# Patient Record
Sex: MALE | Race: BLACK OR AFRICAN AMERICAN | NOT HISPANIC OR LATINO | Employment: FULL TIME | URBAN - METROPOLITAN AREA
[De-identification: names, ages, dates, MRNs, and addresses within clinical notes are randomized per-mention and may not be internally consistent; named-entity substitution may affect disease eponyms.]

---

## 2017-11-13 ENCOUNTER — OFFICE VISIT (OUTPATIENT)
Dept: FAMILY MEDICINE | Facility: CLINIC | Age: 52
End: 2017-11-13
Payer: COMMERCIAL

## 2017-11-13 VITALS
TEMPERATURE: 97 F | RESPIRATION RATE: 20 BRPM | SYSTOLIC BLOOD PRESSURE: 118 MMHG | BODY MASS INDEX: 25.62 KG/M2 | HEART RATE: 76 BPM | DIASTOLIC BLOOD PRESSURE: 80 MMHG | WEIGHT: 183 LBS | HEIGHT: 71 IN

## 2017-11-13 DIAGNOSIS — N52.9 VASCULOGENIC ERECTILE DYSFUNCTION, UNSPECIFIED VASCULOGENIC ERECTILE DYSFUNCTION TYPE: ICD-10-CM

## 2017-11-13 DIAGNOSIS — J20.8 ACUTE BRONCHITIS DUE TO OTHER SPECIFIED ORGANISMS: ICD-10-CM

## 2017-11-13 PROCEDURE — 99213 OFFICE O/P EST LOW 20 MIN: CPT | Mod: ,,, | Performed by: FAMILY MEDICINE

## 2017-11-13 RX ORDER — AZITHROMYCIN 250 MG/1
250 TABLET, FILM COATED ORAL DAILY
Qty: 6 TABLET | Refills: 0 | Status: SHIPPED | OUTPATIENT
Start: 2017-11-13 | End: 2018-03-13 | Stop reason: ALTCHOICE

## 2017-11-13 RX ORDER — PROMETHAZINE HYDROCHLORIDE AND DEXTROMETHORPHAN HYDROBROMIDE 6.25; 15 MG/5ML; MG/5ML
5 SYRUP ORAL 4 TIMES DAILY
Qty: 180 ML | Refills: 2 | Status: SHIPPED | OUTPATIENT
Start: 2017-11-13 | End: 2017-11-23

## 2017-11-13 RX ORDER — TADALAFIL 20 MG/1
20 TABLET ORAL DAILY
Qty: 12 TABLET | Refills: 11 | Status: SHIPPED | OUTPATIENT
Start: 2017-11-13 | End: 2018-06-29

## 2017-11-13 NOTE — PROGRESS NOTES
Subjective:       Patient ID: Abiodun Mora III is a 52 y.o. male.    Chief Complaint: Fever (101) and Chest Congestion    Fever    This is a new (5dd) problem. Episode onset: 5 dd. The problem occurs constantly. The problem has been unchanged. The maximum temperature noted was 101 to 101.9 F. The temperature was taken using an oral thermometer. Associated symptoms include chest pain (with cough), congestion and coughing. Pertinent negatives include no headaches, muscle aches or sore throat. Treatments tried: mucinex.   Risk factors: no contaminated food, no contaminated water, no occupational exposure, no recent sickness, no recent travel and no sick contacts    URI    This is a new problem. Episode onset: 5dd. The problem has been gradually improving. The maximum temperature recorded prior to his arrival was 101 - 101.9 F. The fever has been present for 1 to 2 days. Associated symptoms include chest pain (with cough), congestion and coughing. Pertinent negatives include no headaches or sore throat.     Review of Systems   Constitutional: Positive for fever.   HENT: Positive for congestion. Negative for sore throat.    Respiratory: Positive for cough.    Cardiovascular: Positive for chest pain (with cough).   Neurological: Negative for headaches.       Objective:      Physical Exam   Constitutional: He appears well-developed and well-nourished. No distress.   HENT:   Head: Normocephalic and atraumatic.   Right Ear: Hearing, tympanic membrane, external ear and ear canal normal. No drainage, swelling or tenderness. No foreign bodies. Tympanic membrane is not injected, not scarred, not perforated, not erythematous, not retracted and not bulging. Tympanic membrane mobility is normal. No middle ear effusion. No hemotympanum. No decreased hearing is noted.   Left Ear: Hearing, tympanic membrane, external ear and ear canal normal. No drainage, swelling or tenderness. No foreign bodies. Tympanic membrane is not  injected, not scarred, not perforated, not erythematous, not retracted and not bulging. Tympanic membrane mobility is normal.  No middle ear effusion. No hemotympanum. No decreased hearing is noted.   Nose: Nose normal. No mucosal edema, rhinorrhea, nose lacerations, sinus tenderness, nasal deformity or septal deviation. Right sinus exhibits no maxillary sinus tenderness and no frontal sinus tenderness. Left sinus exhibits no maxillary sinus tenderness and no frontal sinus tenderness.   Mouth/Throat: Uvula is midline and oropharynx is clear and moist. Normal dentition. No oropharyngeal exudate, posterior oropharyngeal edema, posterior oropharyngeal erythema or tonsillar abscesses.   Eyes: Conjunctivae and EOM are normal. Pupils are equal, round, and reactive to light. Right eye exhibits no discharge. Left eye exhibits no discharge. No scleral icterus.   Neck: Normal range of motion. Neck supple. No JVD present. No tracheal deviation present. No thyromegaly present.   Cardiovascular: Normal rate, regular rhythm, normal heart sounds and intact distal pulses.  Exam reveals no gallop and no friction rub.    No murmur heard.  Pulmonary/Chest: Effort normal and breath sounds normal. No stridor. No respiratory distress. He has no wheezes. He has no rales. He exhibits no tenderness.   Exp. Rhonchi/coarse congested cough   Abdominal: Soft. Bowel sounds are normal.   Lymphadenopathy:     He has no cervical adenopathy.   Skin: He is not diaphoretic.   Nursing note and vitals reviewed.      Assessment:       1. Acute bronchitis due to other specified organisms    2. Vasculogenic erectile dysfunction, unspecified vasculogenic erectile dysfunction type        Plan:       Abiodun was seen today for fever and chest congestion.    Diagnoses and all orders for this visit:    Acute bronchitis due to other specified organisms  -     azithromycin (Z-RANDA) 250 MG tablet; Take 1 tablet (250 mg total) by mouth once daily. po on day 1 then 1  tab po on days 2-5  -     promethazine-dextromethorphan (PROMETHAZINE-DM) 6.25-15 mg/5 mL Syrp; Take 5 mLs by mouth 4 (four) times daily.    Vasculogenic erectile dysfunction, unspecified vasculogenic erectile dysfunction type  -     tadalafil (CIALIS) 20 MG Tab; Take 1 tablet (20 mg total) by mouth once daily.         Return if symptoms worsen or fail to improve, for annual.

## 2017-11-16 DIAGNOSIS — J30.89 CHRONIC NON-SEASONAL ALLERGIC RHINITIS, UNSPECIFIED TRIGGER: Primary | ICD-10-CM

## 2017-11-16 RX ORDER — FLUTICASONE PROPIONATE 50 MCG
1 SPRAY, SUSPENSION (ML) NASAL DAILY
Qty: 1 BOTTLE | Refills: 11 | Status: SHIPPED | OUTPATIENT
Start: 2017-11-16 | End: 2018-09-25 | Stop reason: SDUPTHER

## 2017-11-16 RX ORDER — FLUTICASONE PROPIONATE 50 MCG
1 SPRAY, SUSPENSION (ML) NASAL DAILY
COMMUNITY
End: 2017-11-16 | Stop reason: SDUPTHER

## 2017-11-16 NOTE — TELEPHONE ENCOUNTER
----- Message from Glory Lincoln sent at 11/16/2017 10:18 AM CST -----  WANT TO KNOW CAN YOU REFILL HIS NASAL SPRAY [ FLUTICASONE/ PROPIONATE AND SEND [facility] TO [facility].

## 2018-01-29 ENCOUNTER — TELEPHONE (OUTPATIENT)
Dept: ORTHOPEDICS | Facility: CLINIC | Age: 53
End: 2018-01-29

## 2018-01-29 DIAGNOSIS — M25.511 RIGHT SHOULDER PAIN, UNSPECIFIED CHRONICITY: Primary | ICD-10-CM

## 2018-01-29 NOTE — TELEPHONE ENCOUNTER
Called and LVM  Asking pt to come in tomorrow morning due to MD having surgery in the afternoon.  Asked him to call back for appt time.

## 2018-01-31 DIAGNOSIS — M25.511 RIGHT SHOULDER PAIN, UNSPECIFIED CHRONICITY: Primary | ICD-10-CM

## 2018-02-02 ENCOUNTER — HOSPITAL ENCOUNTER (OUTPATIENT)
Dept: RADIOLOGY | Facility: HOSPITAL | Age: 53
Discharge: HOME | End: 2018-02-02
Attending: ORTHOPAEDIC SURGERY
Payer: OTHER MISCELLANEOUS

## 2018-02-02 ENCOUNTER — OFFICE VISIT (OUTPATIENT)
Dept: ORTHOPEDICS | Facility: CLINIC | Age: 53
End: 2018-02-02
Payer: OTHER MISCELLANEOUS

## 2018-02-02 VITALS
BODY MASS INDEX: 26.2 KG/M2 | WEIGHT: 183 LBS | SYSTOLIC BLOOD PRESSURE: 120 MMHG | HEIGHT: 70 IN | DIASTOLIC BLOOD PRESSURE: 76 MMHG | HEART RATE: 78 BPM

## 2018-02-02 DIAGNOSIS — M25.511 RIGHT SHOULDER PAIN, UNSPECIFIED CHRONICITY: ICD-10-CM

## 2018-02-02 DIAGNOSIS — M54.2 CERVICAL PAIN: Primary | ICD-10-CM

## 2018-02-02 DIAGNOSIS — M54.2 NECK PAIN ON LEFT SIDE: Primary | ICD-10-CM

## 2018-02-02 PROCEDURE — 73030 X-RAY EXAM OF SHOULDER: CPT | Mod: 26,LT,, | Performed by: RADIOLOGY

## 2018-02-02 PROCEDURE — 99203 OFFICE O/P NEW LOW 30 MIN: CPT | Mod: S$GLB,,, | Performed by: ORTHOPAEDIC SURGERY

## 2018-02-02 PROCEDURE — 99999 PR PBB SHADOW E&M-EST. PATIENT-LVL III: CPT | Mod: PBBFAC,,, | Performed by: ORTHOPAEDIC SURGERY

## 2018-02-02 PROCEDURE — 3008F BODY MASS INDEX DOCD: CPT | Mod: S$GLB,,, | Performed by: ORTHOPAEDIC SURGERY

## 2018-02-02 PROCEDURE — 73030 X-RAY EXAM OF SHOULDER: CPT | Mod: TC,PN,LT

## 2018-02-06 NOTE — PROGRESS NOTES
Past Medical History:   Diagnosis Date    Hyperlipidemia        Past Surgical History:   Procedure Laterality Date    COLONOSCOPY      2017    VASECTOMY  2000       Current Outpatient Prescriptions   Medication Sig    azithromycin (Z-RANDA) 250 MG tablet Take 1 tablet (250 mg total) by mouth once daily. po on day 1 then 1 tab po on days 2-5    fluticasone (FLONASE) 50 mcg/actuation nasal spray 1 spray by Each Nare route once daily.    tadalafil (CIALIS) 20 MG Tab Take 1 tablet (20 mg total) by mouth once daily.     No current facility-administered medications for this visit.        Review of patient's allergies indicates:  No Known Allergies    Family History   Problem Relation Age of Onset    Cancer Mother      colon    Cancer Father      prostate       Social History     Social History    Marital status:      Spouse name: N/A    Number of children: N/A    Years of education: N/A     Occupational History    Not on file.     Social History Main Topics    Smoking status: Never Smoker    Smokeless tobacco: Never Used    Alcohol use Yes    Drug use: No    Sexual activity: Not on file     Other Topics Concern    Not on file     Social History Narrative    No narrative on file       Chief Complaint:   Chief Complaint   Patient presents with    Left Shoulder - Injury, Pain       Consulting Physician: Self, Aaareferral    History of present illness:    This is a 52 y.o. 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 States he landed into wall. Pain 5/10 and worse with sleeping and neck motion.     Review of Systems:    Constitution: Denies chills, fever, and sweats.  HENT: Denies headaches or blurry vision.  Cardiovascular: Denies chest pain or irregular heart beat.  Respiratory: Denies cough or shortness of breath.  Gastrointestinal: Denies abdominal pain, nausea, or vomiting.  Musculoskeletal:  Denies muscle cramps.  Neurological: Denies dizziness or focal  "weakness.  Psychiatric/Behavioral: Normal mental status.  Hematologic/Lymphatic: Denies bleeding problem or easy bruising/bleeding.  Skin: Denies rash or suspicious lesions.    Examination:    Vital Signs:    Vitals:    02/02/18 1517   BP: 120/76   Pulse: 78   Weight: 83 kg (183 lb)   Height: 5' 10" (1.778 m)   PainSc:   5   PainLoc: Shoulder       Body mass index is 26.26 kg/m².    This a well-developed, well nourished patient in no acute distress.    Alert and oriented x 3 and cooperative to examination.       Physical Exam: Left Shoulder Exam     Skin  Rash:   None  Scars:   None    Inspection/Observation   Swelling:   None  Erythema:   None  Bruising:   None  Wounds:   None  Scapular Winging:  None  Scapular Dyskinesia:  None  Atrophy:   None  Masses:   None  Lymphadenopathy: None    Tenderness   AC Joint:   None    Range of Motion   Active Forward Flexion:  180   Active External Rotation:  >45  Active Internal Rotation:  Low Back    Passive Forward Flexion:  180  Passive External Rotation: >45  Passive Internal Rotation at 90 degrees: Normal    Muscle Strength   Forward Flexion:  5/5  External Rotation: 5/5  Internal Rotation:  5/5    Instability:  None    Tests & Signs   Cross Arm:   Negative  Hawkin's test:   Negative  Impingement:   Negative    Other   Sensation:  Normal  Pulse:    2+ radial      Neck Exam    Skin  Rash:   None  Scars:   None    Inspection  Erythema:  None  Ecchymosis:  None  Swelling:  None  Masses:  None  Lymphadenopathy: None    Range of Motion  Flexion:  Full  Extension:  Full  Right Rotation: Full  Left Rotation:  Full    Strength:  Normal  Instability:  None  Sensation:  Normal  Spurling's:  +  Tenderness:  Left trapezius/scalene          Imaging: X-rays ordered and reviewed today personally of the left shoulder appear normal        Assessment: Neck pain on left side        Plan:  His pain seems all neck based. Shoulder fine. Will send to PT. Already using flexeril and " ibuprofen.      DISCLAIMER: This note may have been dictated using voice recognition software and may contain grammatical errors.     NOTE: Consult report sent to referring provider via [employer] EMR.

## 2018-02-21 ENCOUNTER — CLINICAL SUPPORT (OUTPATIENT)
Dept: REHABILITATION | Facility: HOSPITAL | Age: 53
End: 2018-02-21
Attending: ORTHOPAEDIC SURGERY
Payer: OTHER MISCELLANEOUS

## 2018-02-21 DIAGNOSIS — M54.2 NECK PAIN: ICD-10-CM

## 2018-02-21 DIAGNOSIS — M54.2 NECK PAIN ON LEFT SIDE: Primary | ICD-10-CM

## 2018-02-21 DIAGNOSIS — R29.898 DECREASED RANGE OF MOTION OF NECK: ICD-10-CM

## 2018-02-21 PROCEDURE — 97010 HOT OR COLD PACKS THERAPY: CPT | Mod: PN

## 2018-02-21 PROCEDURE — 97161 PT EVAL LOW COMPLEX 20 MIN: CPT | Mod: PN

## 2018-02-21 PROCEDURE — 97110 THERAPEUTIC EXERCISES: CPT | Mod: PN

## 2018-02-21 NOTE — PLAN OF CARE
TIME RECORD    Date: 02/21/2018    Start Time:  1600  Stop Time:  1700    PROCEDURES:    TIMED  Procedure Time Min.   There ex Start:1645  Stop:1700    Heating pad Start:1630  Stop:1645     Start:  Stop:     Start:  Stop:          UNTIMED  Procedure Time Min.   eval Start:1600  Stop:1630     Start:  Stop:      Total Timed Minutes:  30  Total Timed Units:  2  Total Untimed Units:  1  Charges Billed/# of units:  3    OUTPATIENT PHYSICAL THERAPY   PATIENT EVALUATION  Onset Date: 01/23/18  Primary Diagnosis:   1. Neck pain on left side     2. Neck pain     3. Decreased range of motion of neck       Treatment Diagnosis: debility to left sided  Past Medical History:   Diagnosis Date    Hyperlipidemia      Precautions: none  Prior Therapy: none  Medications: Abiodun Mora III has a current medication list which includes the following prescription(s): azithromycin, fluticasone, and tadalafil.  Nutrition:  Normal  History of Present Illness: reports fell off a ladder at work about 3 wks ago; c/o lt sided neck and upper trap pain since then  Prior Level of Function: Independent  Social History: works as a   Place of Residence (Steps/Adaptations): lives w/ family in 1-story [facility] home  Functional Deficits Leading to Referral/Nature of Injury: difficulty performing everyday activities  Patient Therapy Goals: decrease c/o pain    Subjective     Abiodun Mora III states that his neck pain limits his ability to perform his everyday activities.    Pain:  Location: c/s and into lt upper trap  Description: Aching and Dull  Activities Which Increase Pain: AROM  Activities Which Decrease Pain: rest; muscle relaxors  Pain Scale: 3/10 at best 5/10 now  7/10 at worst    Objective     Posture: guarded due to pain  Palpation: pain w/ palpation to affected area  Sensation: intact  DTRs:  Range of Motion/Strength: MMT = 4/5 kailee. UE's throughout; AROM = limited lt rot and rt SB, WFL elsewhere    Flexibility: limited lt rot  and rt SB  Gait: Without AD  Analysis: WNL  Bed Mobility: NT  Transfers: Independent  Special Tests: NDI = 17/50  Other:   Treatment: x 10 c/s AROM = FB/BB, kailee. Rot., kailee. SB, chin tuck; x 15 min heating pad c/s; 5 x 10 sec lt upper trap stretch; provided pt. w/ HEP - instructed to perform qd    Assessment       Initial Assessment (Pertinent finding, problem list and factors affecting outcome): presents w/ mod. pain levels in c/s and lt upper trap; patient would benefit from PT to address this area and establish HEP  Rehab Potiential: good    Short Term Goals (2 Weeks):   1.  Rodriguez. tx session w/out increase in symptoms.  2.  Decrease c/o pain to 3/10    Long Term Goals (4 Weeks):   1.  Demo comp w/ HEP  2.  Improve NDI score to 13/50    Plan     Certification Period: 02/21/18 to 03/24/18  Recommended Treatment Plan: 2 times per week for 4 weeks (starting wk of 02/26/18): Group Therapy, Manual Therapy, Moist Heat/ Ice, Therapeutic Activites, Therapeutic Exercise and Other HEP  Other Recommendations: NA      Therapist: Anil Dominguez, PT    I CERTIFY THE NEED FOR THESE SERVICES FURNISHED UNDER THIS PLAN OF TREATMENT AND WHILE UNDER MY CARE    Physician's comments: ________________________________________________________________________________________________________________________________________________      Physician's Name: ___________________________________

## 2018-02-26 ENCOUNTER — TELEPHONE (OUTPATIENT)
Dept: REHABILITATION | Facility: HOSPITAL | Age: 53
End: 2018-02-26

## 2018-02-28 ENCOUNTER — CLINICAL SUPPORT (OUTPATIENT)
Dept: REHABILITATION | Facility: HOSPITAL | Age: 53
End: 2018-02-28
Attending: ORTHOPAEDIC SURGERY
Payer: OTHER MISCELLANEOUS

## 2018-02-28 DIAGNOSIS — M54.2 NECK PAIN: ICD-10-CM

## 2018-02-28 DIAGNOSIS — R29.898 DECREASED RANGE OF MOTION OF NECK: ICD-10-CM

## 2018-02-28 PROCEDURE — 97110 THERAPEUTIC EXERCISES: CPT | Mod: PN

## 2018-02-28 PROCEDURE — 97010 HOT OR COLD PACKS THERAPY: CPT | Mod: PN

## 2018-02-28 PROCEDURE — 97140 MANUAL THERAPY 1/> REGIONS: CPT | Mod: PN

## 2018-02-28 NOTE — PROGRESS NOTES
"Name: Abiodun JONES [facility] Number: [id number]  Date of Treatment: 02/28/2018   Diagnosis:   Encounter Diagnoses   Name Primary?    Neck pain     Decreased range of motion of neck        Time in: 0755  Time Out: 0843  Total Treatment Time: 48  Group Time: 0      Subjective:    Abiodun reports continued L upper trap soreness, reports doing HEP.  Patient reports their pain to be 6/10 on a 0-10 scale with 0 being no pain and 10 being the worst pain imaginable. Reports 2/10 pain after session.    Objective    Abiodun received therapeutic exercises to develop strength, endurance, flexibility and posture for 30 minutes including:     UBE 3'/3'  Cervical ROM 10 x5": chin tucks, flexion, extension, side bend, rotation R/L  L Upper trap stretch 5x10"  Pulleys x30: rows and ext 20#  Retro shoulder rolls x30  Shoulder shrugs x30    Abiodun received the following manual therapy techniques in sit: Myofacial release and Soft tissue Mobilization were applied to the: L upper trap and neck for 8 minutes.     Cold pack to neck 10' to decrease soreness    Pt demo good understanding of the education provided. Abiodun demonstrated good return demonstration of activities.     Assessment:     Pt will continue to benefit from skilled PT intervention. Medical Necessity is demonstrated by:  Pain limits function of effected part for some activities, Unable to participate fully in daily activities, Requires skilled supervision to complete and progress HEP and Weakness.    Patient is making good progress towards established goals.    Plan:    Continue with established Plan of Care towards PT goals.   "

## 2018-03-06 ENCOUNTER — CLINICAL SUPPORT (OUTPATIENT)
Dept: REHABILITATION | Facility: HOSPITAL | Age: 53
End: 2018-03-06
Payer: OTHER MISCELLANEOUS

## 2018-03-06 DIAGNOSIS — M54.2 NECK PAIN: ICD-10-CM

## 2018-03-06 DIAGNOSIS — R29.898 DECREASED RANGE OF MOTION OF NECK: ICD-10-CM

## 2018-03-06 PROCEDURE — 97110 THERAPEUTIC EXERCISES: CPT | Mod: PN

## 2018-03-06 PROCEDURE — 97010 HOT OR COLD PACKS THERAPY: CPT | Mod: PN

## 2018-03-06 PROCEDURE — 97140 MANUAL THERAPY 1/> REGIONS: CPT | Mod: PN

## 2018-03-06 NOTE — PROGRESS NOTES
"Name: Abiodun JONES [facility] Number: [id number]  Date of Treatment: 03/06/2018   Diagnosis:   Encounter Diagnoses   Name Primary?    Neck pain     Decreased range of motion of neck        Time in: 0800  Time Out: 0858  Total Treatment Time: 58  Group Time: 0      Subjective:    Abiodun reports continued neck soreness and doing HEP some.  Patient reports their pain to be 6-7/10 on a 0-10 scale with 0 being no pain and 10 being the worst pain imaginable.    Objective    Abiodun received therapeutic exercises to develop strength, endurance, flexibility and posture for 38 minutes including:     UBE 4'/4'  Cervical ROM 2x10 x5": chin tucks, flexion, extension, side bend, rotation R/L  L Upper trap stretch 5x10"  Pulleys 3x10: rows 30# and ext 20#  Retro shoulder rolls x30  Shoulder shrugs x30  Scapular squeezes x30    Abiodun received the following manual therapy techniques in supine: Myofacial release and Soft tissue Mobilization were applied to the: B upper trap and neck for 10 minutes.      Cold pack to neck 10' to decrease soreness    Pt demo good understanding of the education provided. Abiodun demonstrated good return demonstration of activities.     Assessment:     Pt will continue to benefit from skilled PT intervention. Medical Necessity is demonstrated by:  Pain limits function of effected part for some activities, Requires skilled supervision to complete and progress HEP and Weakness.    Patient is making fair progress towards established goals.    Plan:    Continue with established Plan of Care towards PT goals.   "

## 2018-03-09 ENCOUNTER — TELEPHONE (OUTPATIENT)
Dept: ORTHOPEDICS | Facility: CLINIC | Age: 53
End: 2018-03-09

## 2018-03-09 ENCOUNTER — CLINICAL SUPPORT (OUTPATIENT)
Dept: REHABILITATION | Facility: HOSPITAL | Age: 53
End: 2018-03-09
Attending: ORTHOPAEDIC SURGERY
Payer: OTHER MISCELLANEOUS

## 2018-03-09 DIAGNOSIS — M54.2 NECK PAIN: ICD-10-CM

## 2018-03-09 DIAGNOSIS — R29.898 DECREASED RANGE OF MOTION OF NECK: ICD-10-CM

## 2018-03-09 PROCEDURE — 97010 HOT OR COLD PACKS THERAPY: CPT | Mod: PN

## 2018-03-09 PROCEDURE — 97110 THERAPEUTIC EXERCISES: CPT | Mod: PN

## 2018-03-09 NOTE — PROGRESS NOTES
Name: Abiodun Mora III  Clinic Number: [id number]  Date of Treatment: 03/09/2018   Diagnosis:   Encounter Diagnoses   Name Primary?    Neck pain     Decreased range of motion of neck        Time in: 1510  Time Out: 1555  Total Treatment Time: 45  Group Time: NA      Subjective:    Abiodun reports worsening of symptoms.  Patient reports their pain to be 8/10 on a 0-10 scale with 0 being no pain and 10 being the worst pain imaginable.    Objective    Abiodun received therapeutic exercises to develop strength, ROM and flexibility for 45 minutes including:     X 15 min heating pad c/s and kailee. Upper traps  X 15 c/s AROM = FB/BB, kailee. Rot., kailee. SB, chin tucks  5 x 10 sec lt upper trap stretch  X 20 pulley there ex (30#) = rows, high rows, lat pull downs  X 5'/5' UBE (L2)    Assessment:     Mr. Mora was able to miya. tx session w/out increase in symptoms.    Pt will continue to benefit from skilled PT intervention. Medical Necessity is demonstrated by:  Pain limits function of effected part for some activities.    Patient is making fair progress towards established goals.    New/Revised goals: NA      Plan:  Continue with established Plan of Care towards PT goals.

## 2018-03-09 NOTE — TELEPHONE ENCOUNTER
Returned call to pt, LVM advising that since pain was from neck and not shoulder that MD gave one time Rx of medication.  If pain continues he would need to follow up w/ back/spine or PCP for further investigation and refills.

## 2018-03-09 NOTE — TELEPHONE ENCOUNTER
----- Message from Stacey M Lefort sent at 3/9/2018  4:04 PM CST -----  Patient came by and asked for a callback at [phone].  He needs to have refills called in for his Ibuprofen and Flexeril - [facility] [address] [facility]. Thank you.

## 2018-03-12 ENCOUNTER — CLINICAL SUPPORT (OUTPATIENT)
Dept: REHABILITATION | Facility: HOSPITAL | Age: 53
End: 2018-03-12
Payer: OTHER MISCELLANEOUS

## 2018-03-12 DIAGNOSIS — R29.898 DECREASED RANGE OF MOTION OF NECK: ICD-10-CM

## 2018-03-12 DIAGNOSIS — M54.2 NECK PAIN: ICD-10-CM

## 2018-03-12 PROCEDURE — 97010 HOT OR COLD PACKS THERAPY: CPT | Mod: PN

## 2018-03-12 PROCEDURE — 97110 THERAPEUTIC EXERCISES: CPT | Mod: PN

## 2018-03-12 NOTE — PROGRESS NOTES
"Name: Abiodun JONES [facility] Number: [id number]  Date of Treatment: 03/12/2018   Diagnosis:   Encounter Diagnoses   Name Primary?    Neck pain     Decreased range of motion of neck        Time in: 0855  Time Out: 0940  Total Treatment Time: 45  Group Time: 0      Subjective:    Abiodun reports continued neck soreness and doing HEP "some".  Patient reports their pain to be 5/10 on a 0-10 scale with 0 being no pain and 10 being the worst pain imaginable.    Objective    Abiodun received therapeutic exercises to develop strength, endurance, flexibility and posture for 30 minutes including:     UBE 5'/5'  Cervical ROM x15: Chin tucks, rotation, side bend, flexion, extension  Pulleys x30 30#: rows, high rows, pull downs  Scapular squeezes x30  Shoulder shrugs x30  Retro shoulder rolls x30    Hot pack to neck 15' to decrease soreness    Pt demo good understanding of the education provided. Abiodun demonstrated good return demonstration of activities.     Assessment:     Pt will continue to benefit from skilled PT intervention. Medical Necessity is demonstrated by:  Pain limits function of effected part for some activities, Requires skilled supervision to complete and progress HEP and Weakness.    Patient is making fair progress towards established goals.    Plan:    Continue with established Plan of Care towards PT goals.   "

## 2018-03-13 ENCOUNTER — OFFICE VISIT (OUTPATIENT)
Dept: FAMILY MEDICINE | Facility: CLINIC | Age: 53
End: 2018-03-13
Payer: COMMERCIAL

## 2018-03-13 VITALS
SYSTOLIC BLOOD PRESSURE: 110 MMHG | RESPIRATION RATE: 16 BRPM | TEMPERATURE: 98 F | WEIGHT: 190 LBS | BODY MASS INDEX: 27.2 KG/M2 | HEIGHT: 70 IN | HEART RATE: 76 BPM | DIASTOLIC BLOOD PRESSURE: 88 MMHG

## 2018-03-13 DIAGNOSIS — J02.9 PHARYNGITIS, UNSPECIFIED ETIOLOGY: Primary | ICD-10-CM

## 2018-03-13 DIAGNOSIS — J38.3 VOCAL CORD STRAIN: ICD-10-CM

## 2018-03-13 LAB
CTP QC/QA: YES
S PYO RRNA THROAT QL PROBE: NEGATIVE

## 2018-03-13 PROCEDURE — 87880 STREP A ASSAY W/OPTIC: CPT | Mod: QW,,, | Performed by: FAMILY MEDICINE

## 2018-03-13 PROCEDURE — 99213 OFFICE O/P EST LOW 20 MIN: CPT | Mod: ,,, | Performed by: FAMILY MEDICINE

## 2018-03-13 RX ORDER — CYCLOBENZAPRINE HCL 5 MG
5 TABLET ORAL 3 TIMES DAILY PRN
Refills: 0 | COMMUNITY
Start: 2018-01-14 | End: 2019-01-30

## 2018-03-13 RX ORDER — PROMETHAZINE HYDROCHLORIDE AND DEXTROMETHORPHAN HYDROBROMIDE 6.25; 15 MG/5ML; MG/5ML
SYRUP ORAL
COMMUNITY
Start: 2018-03-05 | End: 2018-06-29

## 2018-03-13 RX ORDER — METHYLPREDNISOLONE 4 MG/1
TABLET ORAL
Qty: 1 PACKAGE | Refills: 0 | Status: SHIPPED | OUTPATIENT
Start: 2018-03-13 | End: 2018-06-29

## 2018-03-13 RX ORDER — IBUPROFEN 800 MG/1
800 TABLET ORAL 3 TIMES DAILY
Refills: 0 | COMMUNITY
Start: 2018-01-14 | End: 2018-06-29

## 2018-03-13 NOTE — PROGRESS NOTES
Subjective:       Patient ID: Abiodun Mora III is a 52 y.o. male.    Chief Complaint: Sore Throat      Sore Throat    This is a new problem. Episode onset: 2 weeks. The problem has been waxing and waning. The pain is worse on the right side. There has been no fever. The pain is at a severity of 8/10. The pain is moderate. Associated symptoms include a hoarse voice. Pertinent negatives include no abdominal pain, congestion, coughing, diarrhea, drooling, ear discharge, ear pain, headaches, plugged ear sensation, neck pain, shortness of breath, stridor, swollen glands, trouble swallowing or vomiting.       Allergies and Medications:   Review of patient's allergies indicates:  No Known Allergies  Current Outpatient Prescriptions   Medication Sig Dispense Refill    cyclobenzaprine (FLEXERIL) 5 MG tablet Take 5 mg by mouth 3 (three) times daily as needed.  0    fluticasone (FLONASE) 50 mcg/actuation nasal spray 1 spray by Each Nare route once daily. 1 Bottle 11    ibuprofen (ADVIL,MOTRIN) 800 MG tablet Take 800 mg by mouth 3 (three) times daily.  0    promethazine-dextromethorphan (PROMETHAZINE-DM) 6.25-15 mg/5 mL Syrp       tadalafil (CIALIS) 20 MG Tab Take 1 tablet (20 mg total) by mouth once daily. 12 tablet 11     No current facility-administered medications for this visit.        Family History:   Family History   Problem Relation Age of Onset    Cancer Mother      colon    Cancer Father      prostate       Social History:   Social History     Social History    Marital status:      Spouse name: N/A    Number of children: N/A    Years of education: N/A     Occupational History    Not on file.     Social History Main Topics    Smoking status: Never Smoker    Smokeless tobacco: Never Used    Alcohol use Yes    Drug use: No    Sexual activity: Not on file     Other Topics Concern    Not on file     Social History Narrative    No narrative on file       Review of Systems   HENT: Positive for  hoarse voice and sore throat. Negative for congestion, drooling, ear discharge, ear pain and trouble swallowing.    Respiratory: Negative for cough, shortness of breath and stridor.    Gastrointestinal: Negative for abdominal pain, diarrhea and vomiting.   Musculoskeletal: Negative for neck pain.   Neurological: Negative for headaches.       Objective:     Vitals:    03/13/18 1032   BP: 110/88   Pulse: 76   Resp: 16   Temp: 97.5 °F (36.4 °C)        Physical Exam   Constitutional: He appears well-developed and well-nourished. No distress.   HENT:   Head: Normocephalic and atraumatic.   Right Ear: Hearing, tympanic membrane, external ear and ear canal normal. No drainage, swelling or tenderness. No foreign bodies. Tympanic membrane is not injected, not scarred, not perforated, not erythematous, not retracted and not bulging. Tympanic membrane mobility is normal. No middle ear effusion. No hemotympanum. No decreased hearing is noted.   Left Ear: Hearing, tympanic membrane, external ear and ear canal normal. No drainage, swelling or tenderness. No foreign bodies. Tympanic membrane is not injected, not scarred, not perforated, not erythematous, not retracted and not bulging. Tympanic membrane mobility is normal.  No middle ear effusion. No hemotympanum. No decreased hearing is noted.   Nose: Nose normal. No mucosal edema, rhinorrhea, nose lacerations, sinus tenderness, nasal deformity or septal deviation. Right sinus exhibits no maxillary sinus tenderness and no frontal sinus tenderness. Left sinus exhibits no maxillary sinus tenderness and no frontal sinus tenderness.   Mouth/Throat: Uvula is midline and oropharynx is clear and moist. Normal dentition. No oropharyngeal exudate, posterior oropharyngeal edema, posterior oropharyngeal erythema or tonsillar abscesses.       Eyes: Conjunctivae and EOM are normal. Pupils are equal, round, and reactive to light. Right eye exhibits no discharge. Left eye exhibits no discharge.  No scleral icterus.   Neck: Normal range of motion. Neck supple. No thyromegaly present.   Cardiovascular: Normal rate, regular rhythm, normal heart sounds and intact distal pulses.  Exam reveals no gallop and no friction rub.    No murmur heard.  Pulmonary/Chest: Effort normal and breath sounds normal. No stridor. No respiratory distress. He has no wheezes. He has no rales. He exhibits no tenderness.   Lymphadenopathy:     He has no cervical adenopathy.   Skin: He is not diaphoretic.   Nursing note and vitals reviewed.      Assessment:       No diagnosis found.    Plan:       There are no diagnoses linked to this encounter.     No Follow-up on file.

## 2018-03-13 NOTE — PATIENT INSTRUCTIONS
Benadryl and Maalox mouth rinse- for pharyngitis/laryngitis    Mix 4 oz. Of liquid Benadryl (diphenhydramine) with 4 oz of Maalox. Keep refrigerated. Mix and pour 1/2 oz into a medicine cup, gargle and spit, repeat three times a day.

## 2018-03-13 NOTE — LETTER
March 13, 2018      [facility]  [address] [facility] [address]  Phone: [phone]  Fax: [phone]       Patient: Abiodun Mora   YOB: 1965  Date of Visit: 03/13/2018    To Whom It May Concern:    JEREMIAS Mora  was at [facility] on 03/13/2018. He may return to work/school on 03/14/18 with no restrictions. If you have any questions or concerns, or if I can be of further assistance, please do not hesitate to contact me.    Sincerely,    Saul Mendez MD

## 2018-03-14 ENCOUNTER — CLINICAL SUPPORT (OUTPATIENT)
Dept: REHABILITATION | Facility: HOSPITAL | Age: 53
End: 2018-03-14
Payer: OTHER MISCELLANEOUS

## 2018-03-14 DIAGNOSIS — M54.2 NECK PAIN: ICD-10-CM

## 2018-03-14 DIAGNOSIS — R29.898 DECREASED RANGE OF MOTION OF NECK: ICD-10-CM

## 2018-03-14 PROCEDURE — 97010 HOT OR COLD PACKS THERAPY: CPT | Mod: PN

## 2018-03-14 PROCEDURE — 97110 THERAPEUTIC EXERCISES: CPT | Mod: PN

## 2018-03-14 PROCEDURE — 97140 MANUAL THERAPY 1/> REGIONS: CPT | Mod: PN

## 2018-03-14 NOTE — PROGRESS NOTES
"Name: Abiodun JONES [facility] Number: [id number]  Date of Treatment: 03/14/2018   Diagnosis:   Encounter Diagnoses   Name Primary?    Neck pain     Decreased range of motion of neck        Time in: 0805  Time Out: 0900  Total Treatment Time: 55  Group Time: 0      Subjective:    Abiodun reports continued difficulty with sleep positions and tends to be lesser during the day "when I'm busy", also reports soreness when sitting to watch TV.  Patient reports their pain to be 5/10 on a 0-10 scale with 0 being no pain and 10 being the worst pain imaginable. Patient encouraged to use a soft neck roll and explore options of supporting neck during these times to encourage relaxation of neck muscles.    Objective    Patient participated in dynamic functional therapeutic activities to improve functional performance for 30 minutes. Including:     OHP 5'  UBE Lv2 5'/5'  Cervical ROM 10x10":  R sidebend, R rotation  L UT stretch 3x30" while holding chair  L LS stretch 3x30" while holding chair  Pulleys x30 35#: rows, high rows, pull downs    Abiodun received the following manual therapy techniques: Myofacial release and Soft tissue Mobilization were applied to the: L upper trap for 10 minutes.     Hot pack to neck 15' to decrease soreness    Pt demo good understanding of the education provided. Abiodun demonstrated good return demonstration of activities.     Assessment:     Pt will continue to benefit from skilled PT intervention. Medical Necessity is demonstrated by:  Pain limits function of effected part for some activities, Requires skilled supervision to complete and progress HEP and Weakness.    Patient is making fair progress towards established goals.    Plan:    Continue with established Plan of Care towards PT goals.   "

## 2018-03-16 ENCOUNTER — DOCUMENTATION ONLY (OUTPATIENT)
Dept: REHABILITATION | Facility: HOSPITAL | Age: 53
End: 2018-03-16

## 2018-03-16 NOTE — PROGRESS NOTES
Mr. Mora would benefit from further PT visits to cont. addressing pain in left side of c/s and upper trap.  The pain is persistent but he reports paliative relief after treatment session.  Patient is receiving modalities and instruction in there ex/stretches - please cont. towards tx plan established on initial assessment.

## 2018-03-19 ENCOUNTER — CLINICAL SUPPORT (OUTPATIENT)
Dept: REHABILITATION | Facility: HOSPITAL | Age: 53
End: 2018-03-19
Payer: OTHER MISCELLANEOUS

## 2018-03-19 DIAGNOSIS — M54.2 NECK PAIN: ICD-10-CM

## 2018-03-19 DIAGNOSIS — R29.898 DECREASED RANGE OF MOTION OF NECK: ICD-10-CM

## 2018-03-19 PROCEDURE — 97140 MANUAL THERAPY 1/> REGIONS: CPT | Mod: PN

## 2018-03-19 PROCEDURE — 97110 THERAPEUTIC EXERCISES: CPT | Mod: PN

## 2018-03-19 PROCEDURE — 97150 GROUP THERAPEUTIC PROCEDURES: CPT | Mod: PN

## 2018-03-19 PROCEDURE — 97010 HOT OR COLD PACKS THERAPY: CPT | Mod: PN

## 2018-03-19 NOTE — PROGRESS NOTES
"Name: Abiodun JONES [facility] Number: [id number]  Date of Treatment: 03/19/2018   Diagnosis:   Encounter Diagnoses   Name Primary?    Neck pain     Decreased range of motion of neck        Time in: 0804  Time Out: 0900  Total Treatment Time: 56  Grouped time: 3214-3515      Subjective:    Abiodun reports I was really in more pain when I got here this am, but as I have started moving around a bit it's loosening up and feeling better.  Patient reports their pain to be 8/10 on a 0-10 scale with 0 being no pain and 10 being the worst pain imaginable.    Objective    Patient received individual and group therapy to increase strength, endurance, ROM, flexibility and posture with 1 patients with activities as follows:     Abiodun received therapeutic exercises to develop strength, endurance, ROM, flexibility and posture for 45 minutes including:    OHP 5'    UBE Lv2 5'/5' (1-1)   Cervical ROM 10x10":  R sidebend, R rotation   L UT stretch 3x30" while holding chair   L LS stretch 3x30" while holding chair   Pulleys x30 35#: rows, high rows, pull downs      MHP x 15' to cspine prior to MT.   Abiodun received the following manual therapy techniques: Myofacial release and Soft tissue Mobilization were applied to the: L upper trap for 10 minutes.     Written Home Exercises Provided: Cont with HEP  Pt demo good understanding of the education provided. Abiodun demonstrated good return demonstration of activities.     Assessment:   Patient with increasing ROM with decreasing discomfort, decreased tenderness with MT    Pt will continue to benefit from skilled PT intervention. Medical Necessity is demonstrated by:  Continued inability to participate in vocational pursuits, Pain limits function of effected part for some activities, Unable to participate fully in daily activities, Requires skilled supervision to complete and progress HEP and Weakness.    Patient is making good progress towards established goals.    Plan:  Continue with " established Plan of Care towards PT goals.

## 2018-03-21 ENCOUNTER — CLINICAL SUPPORT (OUTPATIENT)
Dept: REHABILITATION | Facility: HOSPITAL | Age: 53
End: 2018-03-21
Attending: ORTHOPAEDIC SURGERY
Payer: OTHER MISCELLANEOUS

## 2018-03-21 DIAGNOSIS — R29.898 DECREASED RANGE OF MOTION OF NECK: ICD-10-CM

## 2018-03-21 DIAGNOSIS — M54.2 NECK PAIN: ICD-10-CM

## 2018-03-21 PROCEDURE — 97110 THERAPEUTIC EXERCISES: CPT | Mod: PN

## 2018-03-21 PROCEDURE — 97010 HOT OR COLD PACKS THERAPY: CPT | Mod: PN

## 2018-03-21 NOTE — PROGRESS NOTES
[facility]   [facility] Number: [id number]   Age: 52 y.o.   Diagnosis:   Encounter Diagnoses   Name Primary?    Neck pain     Decreased range of motion of neck       Physician: Eugene Ortez MD   Original Orders : PT eval and treat  Initial visit: 02/21/18  Date of Last visit: 03/21/18  Date of Discharge Note:  03/21/18  Total Visits Received: 8  Missed Visits: 1    Subjective: reports 7/10 pain    Objective:  Treatment :    Included:Therapeutic exercise, AROM, Moist heat, Stretching and HEP        Treatment today:      X 15 min heating pad c/s and kailee. Upper traps  X 15 c/s AROM = FB/BB, kailee. Rot., kailee. SB, chin tucks  5 x 10 sec lt upper trap stretch  Reviewed HEP - understood importance of cont. to perform on his own to maintain therapeutic gains    Time In: 1500  Time Out: 1545     Assessment: NDI score = 15/50; independent w/ HEP    Goals Achieved: 1 of 2 stg's met; 1 of 2 ltg's met    Discharge reason : Patient has maximized benefit from PT at this time    Discharge plan :Continue HEP and follow up w/ MD    Plan:  This patient is discharged from [facility] Services.

## 2018-04-13 ENCOUNTER — TELEPHONE (OUTPATIENT)
Dept: ORTHOPEDICS | Facility: CLINIC | Age: 53
End: 2018-04-13

## 2018-04-13 NOTE — TELEPHONE ENCOUNTER
Pt returned my call, advised that he was released from our care at last appt.  He will need to contact his worker's comp carrier to be referred to appropriate neck/back/spine provider.  appt for 4/18 has been cancelled.

## 2018-04-13 NOTE — TELEPHONE ENCOUNTER
Called and LVM askng pt to call me back.  Was last seen for shoulder pain that turned out to be neck related.  Pt was released back to work w/ no restriction.  Need to know if f/u is for neck, if so appt will need to be cancelled as Dr. Ortez does not treat neck/back issues.

## 2018-06-29 ENCOUNTER — OFFICE VISIT (OUTPATIENT)
Dept: FAMILY MEDICINE | Facility: CLINIC | Age: 53
End: 2018-06-29

## 2018-06-29 VITALS
DIASTOLIC BLOOD PRESSURE: 80 MMHG | SYSTOLIC BLOOD PRESSURE: 110 MMHG | HEART RATE: 60 BPM | BODY MASS INDEX: 26.74 KG/M2 | WEIGHT: 191 LBS | HEIGHT: 71 IN | OXYGEN SATURATION: 98 % | RESPIRATION RATE: 16 BRPM | TEMPERATURE: 97 F

## 2018-06-29 DIAGNOSIS — Z23 IMMUNIZATION DUE: ICD-10-CM

## 2018-06-29 DIAGNOSIS — N52.8 OTHER MALE ERECTILE DYSFUNCTION: ICD-10-CM

## 2018-06-29 DIAGNOSIS — Z00.00 WELL ADULT EXAM: Primary | ICD-10-CM

## 2018-06-29 DIAGNOSIS — E78.2 MIXED HYPERLIPIDEMIA: ICD-10-CM

## 2018-06-29 PROBLEM — E78.5 HYPERLIPIDEMIA: Status: ACTIVE | Noted: 2018-06-29

## 2018-06-29 PROCEDURE — 90715 TDAP VACCINE 7 YRS/> IM: CPT | Mod: ,,, | Performed by: FAMILY MEDICINE

## 2018-06-29 PROCEDURE — 90471 IMMUNIZATION ADMIN: CPT | Mod: ,,, | Performed by: FAMILY MEDICINE

## 2018-06-29 PROCEDURE — 99396 PREV VISIT EST AGE 40-64: CPT | Mod: ,,, | Performed by: FAMILY MEDICINE

## 2018-06-29 RX ORDER — TADALAFIL 20 MG/1
20 TABLET ORAL DAILY
Qty: 30 TABLET | Refills: 11 | Status: SHIPPED | OUTPATIENT
Start: 2018-06-29 | End: 2019-01-30

## 2018-06-29 RX ORDER — ROSUVASTATIN CALCIUM 10 MG/1
10 TABLET, COATED ORAL DAILY
Qty: 30 TABLET | Refills: 5 | Status: SHIPPED | OUTPATIENT
Start: 2018-06-29 | End: 2018-07-11 | Stop reason: ALTCHOICE

## 2018-06-29 RX ORDER — TADALAFIL 20 MG/1
20 TABLET ORAL DAILY
Qty: 30 TABLET | Refills: 11 | Status: SHIPPED | OUTPATIENT
Start: 2018-06-29 | End: 2018-06-29 | Stop reason: CLARIF

## 2018-06-29 NOTE — PROGRESS NOTES
Subjective:       Patient ID: Abiodun Mora III is a 52 y.o. male.    Chief Complaint: Annual Exam      HPI    Allergies and Medications:   Review of patient's allergies indicates:  No Known Allergies  Current Outpatient Prescriptions   Medication Sig Dispense Refill    cyclobenzaprine (FLEXERIL) 5 MG tablet Take 5 mg by mouth 3 (three) times daily as needed.  0    fluticasone (FLONASE) 50 mcg/actuation nasal spray 1 spray by Each Nare route once daily. 1 Bottle 11    rosuvastatin (CRESTOR) 10 MG tablet Take 1 tablet (10 mg total) by mouth once daily. 30 tablet 5    tadalafil (CIALIS) 20 MG Tab Take 1 tablet (20 mg total) by mouth once daily. 30 tablet 11     No current facility-administered medications for this visit.        Family History:   Family History   Problem Relation Age of Onset    Cancer Mother         colon    Cancer Father         prostate       Social History:   Social History     Social History    Marital status:      Spouse name: N/A    Number of children: N/A    Years of education: N/A     Occupational History    Not on file.     Social History Main Topics    Smoking status: Never Smoker    Smokeless tobacco: Never Used    Alcohol use Yes    Drug use: No    Sexual activity: Not on file     Other Topics Concern    Not on file     Social History Narrative    No narrative on file       Review of Systems   Constitutional: Negative for activity change, appetite change, chills, diaphoresis, fatigue, fever and unexpected weight change.   HENT: Negative for congestion, dental problem, drooling, ear discharge, ear pain, facial swelling, hearing loss, mouth sores, nosebleeds, postnasal drip, rhinorrhea, sinus pain, sinus pressure, sneezing, sore throat, tinnitus, trouble swallowing and voice change.    Eyes: Negative for photophobia, pain, discharge, redness, itching and visual disturbance.   Respiratory: Negative for apnea, cough, choking, chest tightness, shortness of breath,  wheezing and stridor.    Cardiovascular: Negative for chest pain, palpitations and leg swelling.   Gastrointestinal: Negative for abdominal distention, abdominal pain, anal bleeding, blood in stool, constipation, diarrhea, nausea, rectal pain and vomiting.   Endocrine: Negative for cold intolerance, heat intolerance, polydipsia, polyphagia and polyuria.   Genitourinary: Negative for decreased urine volume, difficulty urinating, discharge, dysuria, enuresis, flank pain, frequency, genital sores, hematuria, penile pain, penile swelling, scrotal swelling, testicular pain and urgency.   Musculoskeletal: Positive for neck pain (cervical strain). Negative for arthralgias, back pain, gait problem, joint swelling, myalgias and neck stiffness.   Skin: Negative for color change, pallor, rash and wound.   Allergic/Immunologic: Negative for environmental allergies, food allergies and immunocompromised state.   Neurological: Negative for dizziness, tremors, seizures, syncope, facial asymmetry, speech difficulty, weakness, light-headedness, numbness and headaches.   Hematological: Negative for adenopathy. Does not bruise/bleed easily.   Psychiatric/Behavioral: Negative for agitation, behavioral problems, confusion, decreased concentration, dysphoric mood, hallucinations, self-injury, sleep disturbance and suicidal ideas. The patient is not nervous/anxious and is not hyperactive.        Objective:     Vitals:    06/29/18 0838   BP: 110/80   Pulse: 60   Resp: 16   Temp: 97.4 °F (36.3 °C)        Physical Exam   Constitutional: He is oriented to person, place, and time. He appears well-developed and well-nourished.  Non-toxic appearance. He does not have a sickly appearance. He does not appear ill. No distress.   HENT:   Head: Normocephalic and atraumatic.   Right Ear: External ear normal.   Left Ear: External ear normal.   Nose: Nose normal.   Mouth/Throat: Oropharynx is clear and moist. No oropharyngeal exudate.   Eyes:  Conjunctivae and EOM are normal. Pupils are equal, round, and reactive to light. Right eye exhibits no discharge. Left eye exhibits no discharge. No scleral icterus.   Neck: Normal range of motion. Neck supple. No JVD present. No tracheal deviation present. No thyromegaly present.   Cardiovascular: Normal rate, normal heart sounds and intact distal pulses.  Exam reveals no gallop and no friction rub.    No murmur heard.  Pulmonary/Chest: Effort normal and breath sounds normal. No stridor. No respiratory distress. He has no wheezes. He has no rales. He exhibits no tenderness.   Abdominal: Soft. Bowel sounds are normal. He exhibits no distension and no mass. There is no tenderness. There is no rebound and no guarding. No hernia.   Small soft rubbery lateral inguinal node on the right side no other adenopathy is noted otherwise normal external  exam. No hernia palpable.   Genitourinary: Testes normal. Cremasteric reflex is present. Right testis shows no mass, no swelling and no tenderness. Right testis is descended. Cremasteric reflex is not absent on the right side. Left testis shows no mass, no swelling and no tenderness. Left testis is descended. Cremasteric reflex is not absent on the left side. Circumcised. No phimosis, paraphimosis, hypospadias or penile erythema. No discharge found.   Musculoskeletal: He exhibits no edema, tenderness or deformity.   Palpable tightness in the trapezius both sides range of motion seems to be intact with some tenderness to to contralateral flexion.   Lymphadenopathy:     He has no cervical adenopathy.   Neurological: He is alert and oriented to person, place, and time. He has normal reflexes. He displays normal reflexes. No cranial nerve deficit or sensory deficit. He exhibits normal muscle tone. Coordination normal.   Skin: Skin is warm and dry. No rash noted. He is not diaphoretic. No erythema. No pallor.   Skin survey negative.   Psychiatric: He has a normal mood and affect.  His behavior is normal. Judgment and thought content normal.   Nursing note and vitals reviewed.      Assessment:       1. Well adult exam    2. Mixed hyperlipidemia    3. Other male erectile dysfunction    4. Immunization due        Plan:       Abiodun was seen today for annual exam.    Diagnoses and all orders for this visit:    Well adult exam  -     Hemoglobin A1c; Future  -     Lipid panel; Future  -     Comprehensive metabolic panel; Future  -     CBC auto differential; Future  -     PSA, Screening; Future  -     Hepatitis C antibody; Future  -     Hemoglobin A1c  -     Lipid panel  -     Comprehensive metabolic panel  -     CBC auto differential  -     PSA, Screening  -     Hepatitis C antibody    Mixed hyperlipidemia  -     Lipid panel; Future  -     rosuvastatin (CRESTOR) 10 MG tablet; Take 1 tablet (10 mg total) by mouth once daily.  -     Lipid panel    Other male erectile dysfunction  -     tadalafil (CIALIS) 20 MG Tab; Take 1 tablet (20 mg total) by mouth once daily.    Immunization due  -     (In Office Administered) Tdap Vaccine    Other orders  -     Discontinue: tadalafil (CIALIS) 20 MG Tab; Take 1 tablet (20 mg total) by mouth once daily.         Follow-up in about 6 months (around 12/29/2018).

## 2018-07-03 LAB
ALBUMIN SERPL-MCNC: 4.1 G/DL (ref 3.1–4.7)
ALP SERPL-CCNC: 49 IU/L (ref 40–104)
ALT (SGPT): 31 IU/L (ref 3–33)
AST SERPL-CCNC: 26 IU/L (ref 10–40)
BASOPHILS NFR BLD: 0 K/UL (ref 0–0.2)
BASOPHILS NFR BLD: 0.6 %
BILIRUB SERPL-MCNC: 0.8 MG/DL (ref 0.3–1)
BUN SERPL-MCNC: 14 MG/DL (ref 8–20)
CALCIUM SERPL-MCNC: 9.3 MG/DL (ref 7.7–10.4)
CHLORIDE: 103 MMOL/L (ref 98–110)
CO2 SERPL-SCNC: 28.1 MMOL/L (ref 22.8–31.6)
COMPLEXED PSA SERPL-MCNC: 0.6 NG/ML (ref 0–3)
CREATININE: 1.21 MG/DL (ref 0.6–1.4)
EOSINOPHIL NFR BLD: 0.1 K/UL (ref 0–0.7)
EOSINOPHIL NFR BLD: 3.9 %
ERYTHROCYTE [DISTWIDTH] IN BLOOD BY AUTOMATED COUNT: 13.5 % (ref 11.7–14.9)
GLUCOSE: 97 MG/DL (ref 70–99)
GRAN #: 1.4 K/UL (ref 1.4–6.5)
GRAN%: 42.5 %
HBA1C MFR BLD: 5.7 % (ref 3.1–6.5)
HCT VFR BLD AUTO: 41.8 % (ref 39–55)
HGB BLD-MCNC: 13.6 G/DL (ref 14–16)
IMMATURE GRANS (ABS): 0 K/UL (ref 0–1)
IMMATURE GRANULOCYTES: 0.3 %
LYMPH #: 1.2 K/UL (ref 1.2–3.4)
LYMPH%: 35.3 %
MCH RBC QN AUTO: 27 PG (ref 25–35)
MCHC RBC AUTO-ENTMCNC: 32.5 G/DL (ref 31–36)
MCV RBC AUTO: 82.9 FL (ref 80–100)
MONO #: 0.6 K/UL (ref 0.1–0.6)
MONO%: 17.4 %
NUCLEATED RBCS: 0 %
PLATELET # BLD AUTO: 196 K/UL (ref 140–440)
PMV BLD AUTO: 9.4 FL (ref 8.8–12.7)
POTASSIUM SERPL-SCNC: 4.3 MMOL/L (ref 3.5–5)
PROT SERPL-MCNC: 7.8 G/DL (ref 6–8.2)
RBC # BLD AUTO: 5.04 M/UL (ref 4.3–5.9)
SODIUM: 139 MMOL/L (ref 134–144)
WBC # BLD AUTO: 3.3 K/UL (ref 5–10)

## 2018-07-04 LAB — HCV AB SERPL QL IA: <0.1 S/CO RATIO (ref 0–0.9)

## 2018-07-11 ENCOUNTER — TELEPHONE (OUTPATIENT)
Dept: FAMILY MEDICINE | Facility: CLINIC | Age: 53
End: 2018-07-11

## 2018-07-11 DIAGNOSIS — E78.2 MIXED HYPERLIPIDEMIA: Primary | ICD-10-CM

## 2018-07-11 RX ORDER — ATORVASTATIN CALCIUM 20 MG/1
20 TABLET, FILM COATED ORAL DAILY
Qty: 90 TABLET | Refills: 3 | Status: SHIPPED | OUTPATIENT
Start: 2018-07-11 | End: 2018-09-19 | Stop reason: SDUPTHER

## 2018-07-11 NOTE — TELEPHONE ENCOUNTER
Insurance preferred cholesterol medication is Atorvastatin Please change Crestor rx to Atorvastatin

## 2018-07-11 NOTE — TELEPHONE ENCOUNTER
Patient reports that atorvastatin is better covered on his prescription formulary change to 20 mg of this one which is comparable to 10 mg of Crestor.

## 2018-09-19 DIAGNOSIS — E78.2 MIXED HYPERLIPIDEMIA: ICD-10-CM

## 2018-09-19 RX ORDER — ATORVASTATIN CALCIUM 20 MG/1
20 TABLET, FILM COATED ORAL DAILY
Qty: 90 TABLET | Refills: 3 | Status: SHIPPED | OUTPATIENT
Start: 2018-09-19 | End: 2019-12-11 | Stop reason: SDUPTHER

## 2018-09-25 DIAGNOSIS — Z88.9 H/O SEASONAL ALLERGIES: ICD-10-CM

## 2018-09-25 RX ORDER — ROSUVASTATIN CALCIUM 10 MG/1
10 TABLET, COATED ORAL DAILY
Refills: 5 | COMMUNITY
Start: 2018-08-09 | End: 2019-01-30

## 2018-09-25 RX ORDER — FLUTICASONE PROPIONATE 50 MCG
1 SPRAY, SUSPENSION (ML) NASAL DAILY
Qty: 1 BOTTLE | Refills: 11 | Status: SHIPPED | OUTPATIENT
Start: 2018-09-25 | End: 2019-10-17 | Stop reason: SDUPTHER

## 2018-10-01 PROBLEM — Z00.00 WELL ADULT EXAM: Status: RESOLVED | Noted: 2018-06-29 | Resolved: 2018-10-01

## 2019-01-30 ENCOUNTER — OFFICE VISIT (OUTPATIENT)
Dept: FAMILY MEDICINE | Facility: CLINIC | Age: 54
End: 2019-01-30
Payer: COMMERCIAL

## 2019-01-30 VITALS
DIASTOLIC BLOOD PRESSURE: 68 MMHG | TEMPERATURE: 100 F | BODY MASS INDEX: 27.37 KG/M2 | RESPIRATION RATE: 14 BRPM | HEART RATE: 118 BPM | HEIGHT: 70 IN | OXYGEN SATURATION: 97 % | SYSTOLIC BLOOD PRESSURE: 110 MMHG | WEIGHT: 191.19 LBS

## 2019-01-30 DIAGNOSIS — J10.1 INFLUENZA A: ICD-10-CM

## 2019-01-30 DIAGNOSIS — J06.9 URI WITH COUGH AND CONGESTION: Primary | ICD-10-CM

## 2019-01-30 PROCEDURE — 3008F BODY MASS INDEX DOCD: CPT | Mod: ,,, | Performed by: NURSE PRACTITIONER

## 2019-01-30 PROCEDURE — 99213 PR OFFICE/OUTPT VISIT, EST, LEVL III, 20-29 MIN: ICD-10-PCS | Mod: ,,, | Performed by: NURSE PRACTITIONER

## 2019-01-30 PROCEDURE — 99213 OFFICE O/P EST LOW 20 MIN: CPT | Mod: ,,, | Performed by: NURSE PRACTITIONER

## 2019-01-30 PROCEDURE — 3008F PR BODY MASS INDEX (BMI) DOCUMENTED: ICD-10-PCS | Mod: ,,, | Performed by: NURSE PRACTITIONER

## 2019-01-30 RX ORDER — OSELTAMIVIR PHOSPHATE 75 MG/1
75 CAPSULE ORAL 2 TIMES DAILY
Qty: 10 CAPSULE | Refills: 0 | Status: SHIPPED | OUTPATIENT
Start: 2019-01-30 | End: 2019-02-04

## 2019-01-30 RX ORDER — PROMETHAZINE HYDROCHLORIDE AND DEXTROMETHORPHAN HYDROBROMIDE 6.25; 15 MG/5ML; MG/5ML
5 SYRUP ORAL EVERY 6 HOURS PRN
Qty: 118 ML | Refills: 0 | Status: SHIPPED | OUTPATIENT
Start: 2019-01-30 | End: 2019-02-04

## 2019-01-30 NOTE — PATIENT INSTRUCTIONS
Influenza (Adult)    Influenza is also called the flu. It is a viral illness that affects the air passages of your lungs. It is different from the common cold. The flu can easily be passed from one to person to another. It may be spread through the air by coughing and sneezing. Or it can be spread by touching the sick person and then touching your own eyes, nose, or mouth.  The flu starts 1 to 3 days after you are exposed to the flu virus. It may last for 1 to 2 weeks but many people feel tired or fatigued for many weeks afterward. You usually dont need to take antibiotics unless you have a complication. This might be an ear or sinus infection or pneumonia.  Symptoms of the flu may be mild or severe. They can include extreme tiredness (wanting to stay in bed all day), chills, fevers, muscle aches, soreness with eye movement, headache, and a dry, hacking cough.  Home care  Follow these guidelines when caring for yourself at home:  · Avoid being around cigarette smoke, whether yours or other peoples.  · Acetaminophen or ibuprofen will help ease your fever, muscle aches, and headache. Dont give aspirin to anyone younger than 18 who has the flu. Aspirin can harm the liver.  · Nausea and loss of appetite are common with the flu. Eat light meals. Drink 6 to 8 glasses of liquids every day. Good choices are water, sport drinks, soft drinks without caffeine, juices, tea, and soup. Extra fluids will also help loosen secretions in your nose and lungs.  · Over-the-counter cold medicines will not make the flu go away faster. But the medicines may help with coughing, sore throat, and congestion in your nose and sinuses. Dont use a decongestant if you have high blood pressure.  · Stay home until your fever has been gone for at least 24 hours without using medicine to reduce fever.  Follow-up care  Follow up with your healthcare provider, or as advised, if you are not getting better over the next week.  If you are age 65 or  older, talk with your provider about getting a pneumococcal vaccine every 5 years. You should also get this vaccine if you have chronic asthma or COPD. All adults should get a flu vaccine every fall. Ask your provider about this.  When to seek medical advice  Call your healthcare provider right away if any of these occur:  · Cough with lots of colored mucus (sputum) or blood in your mucus  · Chest pain, shortness of breath, wheezing, or trouble breathing  · Severe headache, or face, neck, or ear pain  · New rash with fever  · Fever of 100.4°F (38°C) or higher, or as directed by your healthcare provider  · Confusion, behavior change, or seizure  · Severe weakness or dizziness  · You get a new fever or cough after getting better for a few days  Date Last Reviewed: 1/1/2017  © 1364-6459 [employer]. [address] [place] [address], [place] [address]. All rights reserved. This information is not intended as a substitute for professional medical care. Always follow your healthcare professional's instructions.        Adult Self-Care for Colds  Colds are caused by viruses. They can't be cured with antibiotics. However, you can ease symptoms and support your body's efforts to heal itself.  No matter which symptoms you have, be sure to:  · Drink plenty of fluids (water or clear soup)  · Stop smoking and drinking alcohol  · Get plenty of rest    Understand a fever  · Take your temperature several times a day. If your fever is 100.4°F (38.0°C) for more than a day, call your healthcare provider.  · Relax, lie down. Go to bed if you want. Just get off your feet and rest. Also, drink plenty of fluids to avoid dehydration.  · Take acetaminophen or a nonsteroidal anti-inflammatory agent (NSAID), such as ibuprofen.  Treat a troubled nose kindly  · Breathe steam or heated humidified air to open blocked nasal passages.  a hot shower or use a vaporizer. Be careful not to get burned by the steam.  · Saline nasal sprays and  decongestant tablets help open a stuffy nose. Antihistamines can also help, but they can cause side effects such as drowsiness and drying of the eyes, nose, and mouth.  Soothe a sore throat and cough  · Gargle every 2 hours with 1/4 teaspoon of salt dissolved in 1/2 cup of warm water. Suck on throat lozenges and cough drops to moisten your throat.  · Cough medicines are available but it is unclear how well they actually work.  · Take acetaminophen or an NSAID, such as ibuprofen, to ease throat pain  Ease digestive problems  · Put fluids back into your body. Take frequent sips of clear liquids such as water or broth. Avoid drinks that have a lot of sugar in them, such as juices and sodas. These can make diarrhea worse. Older children and adults can drink sports drinks.  · As your appetite returns, you can resume your normal diet. Ask your healthcare provider if there are any foods you should avoid.  When to seek medical care  When you first notice symptoms, ask your healthcare provider if antiviral medicines are appropriate. Antibiotics should not be taken for colds or flu. Also, call your healthcare provider if you have any of the following symptoms or if you aren't feeling better after 7 days:  · Shortness of breath  · Pain or pressure in the chest or belly (abdomen)  · Worsening symptoms, especially after a period of improvement  · Fever of 100.4°F  (38.0°C) or higher, or fever that doesn't go down with medicine  · Sudden dizziness or confusion  · Severe or continued vomiting  · Signs of dehydration, including extreme thirst, dark urine, infrequent urination, dry mouth  · Spotted, red, or very sore throat   Date Last Reviewed: 12/1/2016  © 5006-5022 [employer]. [address] [place] [address], [place] [address]. All rights reserved. This information is not intended as a substitute for professional medical care. Always follow your healthcare professional's instructions.

## 2019-01-30 NOTE — PROGRESS NOTES
SUBJECTIVE:      Patient ID: Abiodun Mora III is a 53 y.o. male.    Chief Complaint: Cough; Generalized Body Aches; and Nasal Congestion    Sick visit - pt of Dr. Andrea CARMONA    This is a new problem. The current episode started in the past 7 days. Maximum temperature: low grade. The fever has been present for less than 1 day. Associated symptoms include congestion, coughing and sinus pain. Pertinent negatives include no abdominal pain, chest pain, diarrhea, dysuria, ear pain, headaches, nausea, neck pain, rash, sneezing, sore throat, vomiting or wheezing. He has tried acetaminophen (home nebs, flonase) for the symptoms. The treatment provided mild relief.       Past Surgical History:   Procedure Laterality Date    COLONOSCOPY      2017    VASECTOMY  2000     Family History   Problem Relation Age of Onset    Cancer Mother         colon    Cancer Father         prostate      Social History     Socioeconomic History    Marital status:      Spouse name: None    Number of children: None    Years of education: None    Highest education level: None   Social Needs    Financial resource strain: None    Food insecurity - worry: None    Food insecurity - inability: None    Transportation needs - medical: None    Transportation needs - non-medical: None   Occupational History    None   Tobacco Use    Smoking status: Never Smoker    Smokeless tobacco: Never Used   Substance and Sexual Activity    Alcohol use: Yes    Drug use: No    Sexual activity: None   Other Topics Concern    None   Social History Narrative    None     Current Outpatient Medications   Medication Sig Dispense Refill    atorvastatin (LIPITOR) 20 MG tablet Take 1 tablet (20 mg total) by mouth once daily. 90 tablet 3    fluticasone (FLONASE) 50 mcg/actuation nasal spray 1 spray (50 mcg total) by Each Nare route once daily. 1 Bottle 11    oseltamivir (TAMIFLU) 75 MG capsule Take 1 capsule (75 mg total) by mouth 2  "(two) times daily. for 5 days 10 capsule 0     No current facility-administered medications for this visit.      Review of patient's allergies indicates:  No Known Allergies   Past Medical History:   Diagnosis Date    Allergy     Hyperlipidemia      Past Surgical History:   Procedure Laterality Date    COLONOSCOPY      2017    VASECTOMY  2000       Review of Systems   Constitutional: Negative for activity change, appetite change, fatigue and fever.   HENT: Positive for congestion and sinus pain. Negative for ear pain, hearing loss, postnasal drip, sinus pressure, sneezing and sore throat.    Eyes: Negative for photophobia and pain.   Respiratory: Positive for cough. Negative for chest tightness, shortness of breath and wheezing.    Cardiovascular: Negative for chest pain and leg swelling.   Gastrointestinal: Negative for abdominal distention, abdominal pain, blood in stool, constipation, diarrhea, nausea and vomiting.   Endocrine: Negative for cold intolerance, heat intolerance, polydipsia and polyuria.   Genitourinary: Negative for difficulty urinating, dysuria, flank pain, frequency, hematuria and urgency.   Musculoskeletal: Positive for myalgias. Negative for arthralgias, back pain, joint swelling and neck pain.   Skin: Negative for pallor and rash.   Allergic/Immunologic: Negative for environmental allergies and food allergies.   Neurological: Positive for light-headedness. Negative for dizziness, weakness and headaches.   Hematological: Does not bruise/bleed easily.   Psychiatric/Behavioral: Negative for confusion, decreased concentration and sleep disturbance. The patient is not nervous/anxious.       OBJECTIVE:      Vitals:    01/30/19 0831   BP: 110/68   Pulse: (!) 118   Resp: 14   Temp: 99.9 °F (37.7 °C)   SpO2: 97%   Weight: 86.7 kg (191 lb 3.2 oz)   Height: 5' 10" (1.778 m)     Physical Exam   Constitutional: He is oriented to person, place, and time. He appears well-developed and well-nourished. He " has a sickly appearance. No distress.   Low grade temp   HENT:   Head: Normocephalic and atraumatic.   Right Ear: Hearing normal. A middle ear effusion (serous) is present.   Left Ear: Hearing normal. A middle ear effusion (serous) is present.   Nose: Nose normal. No rhinorrhea.   Mouth/Throat: Uvula is midline and mucous membranes are normal. Posterior oropharyngeal erythema (clear mucus) present.   Eyes: Conjunctivae and lids are normal. Pupils are equal, round, and reactive to light. Right eye exhibits no discharge. Left eye exhibits no discharge. Right conjunctiva is not injected. Left conjunctiva is not injected. Right pupil is round and reactive. Left pupil is round and reactive. Pupils are equal.   Neck: Trachea normal and normal range of motion. Neck supple. No JVD present. No tracheal deviation present. No thyromegaly present.   Cardiovascular: Normal rate, regular rhythm, normal heart sounds and intact distal pulses. Exam reveals no gallop and no friction rub.   No murmur heard.  Pulses:       Radial pulses are 2+ on the right side, and 2+ on the left side.   Pulmonary/Chest: Effort normal and breath sounds normal. No stridor. No respiratory distress. He has no decreased breath sounds. He has no wheezes. He has no rhonchi. He has no rales.   Abdominal: Soft. Bowel sounds are normal. He exhibits no distension. There is no tenderness. There is no rigidity and no guarding.   Musculoskeletal: Normal range of motion. He exhibits no edema.   Lymphadenopathy:        Head (right side): Submandibular adenopathy present.        Head (left side): Submandibular adenopathy present.     He has no cervical adenopathy.   Neurological: He is alert and oriented to person, place, and time. He has normal strength. He displays no atrophy. He displays a negative Romberg sign. Coordination and gait normal.   Skin: Skin is warm and dry. Capillary refill takes less than 2 seconds. No lesion and no rash noted. No cyanosis. No  pallor.   Psychiatric: He has a normal mood and affect. His speech is normal and behavior is normal. Judgment and thought content normal. Cognition and memory are normal. He is attentive.   Nursing note and vitals reviewed.     Assessment:       1. URI with cough and congestion    2. Influenza A        Plan:       URI with cough and congestion  -     POCT Influenza A/B    Influenza A  -     oseltamivir (TAMIFLU) 75 MG capsule; Take 1 capsule (75 mg total) by mouth 2 (two) times daily. for 5 days  Dispense: 10 capsule; Refill: 0        No Follow-up on file.      1/30/2019 SYMONE West, FNP-C

## 2019-10-17 DIAGNOSIS — Z88.9 H/O SEASONAL ALLERGIES: ICD-10-CM

## 2019-10-17 RX ORDER — FLUTICASONE PROPIONATE 50 MCG
SPRAY, SUSPENSION (ML) NASAL
Qty: 48 ML | Refills: 3 | Status: SHIPPED | OUTPATIENT
Start: 2019-10-17 | End: 2022-09-15 | Stop reason: SDUPTHER

## 2019-10-29 ENCOUNTER — LAB VISIT (OUTPATIENT)
Dept: LAB | Facility: HOSPITAL | Age: 54
End: 2019-10-29
Attending: NURSE PRACTITIONER
Payer: COMMERCIAL

## 2019-10-29 ENCOUNTER — OFFICE VISIT (OUTPATIENT)
Dept: FAMILY MEDICINE | Facility: CLINIC | Age: 54
End: 2019-10-29
Payer: COMMERCIAL

## 2019-10-29 VITALS
WEIGHT: 189 LBS | HEART RATE: 64 BPM | DIASTOLIC BLOOD PRESSURE: 68 MMHG | BODY MASS INDEX: 27.06 KG/M2 | SYSTOLIC BLOOD PRESSURE: 120 MMHG | OXYGEN SATURATION: 96 % | HEIGHT: 70 IN | TEMPERATURE: 98 F

## 2019-10-29 DIAGNOSIS — Z12.5 PROSTATE CANCER SCREENING: ICD-10-CM

## 2019-10-29 DIAGNOSIS — N52.9 ERECTILE DYSFUNCTION, UNSPECIFIED ERECTILE DYSFUNCTION TYPE: ICD-10-CM

## 2019-10-29 DIAGNOSIS — Z23 IMMUNIZATION DUE: ICD-10-CM

## 2019-10-29 DIAGNOSIS — Z00.00 ENCOUNTER FOR ROUTINE ADULT HEALTH EXAMINATION WITHOUT ABNORMAL FINDINGS: ICD-10-CM

## 2019-10-29 DIAGNOSIS — Z00.00 ENCOUNTER FOR ROUTINE ADULT HEALTH EXAMINATION WITHOUT ABNORMAL FINDINGS: Primary | ICD-10-CM

## 2019-10-29 DIAGNOSIS — E78.2 MIXED HYPERLIPIDEMIA: ICD-10-CM

## 2019-10-29 PROBLEM — J20.8 ACUTE BRONCHITIS DUE TO OTHER SPECIFIED ORGANISMS: Status: RESOLVED | Noted: 2017-11-13 | Resolved: 2019-10-29

## 2019-10-29 PROBLEM — J02.9 PHARYNGITIS: Status: RESOLVED | Noted: 2018-03-13 | Resolved: 2019-10-29

## 2019-10-29 PROBLEM — R29.898 DECREASED RANGE OF MOTION OF NECK: Status: RESOLVED | Noted: 2018-02-21 | Resolved: 2019-10-29

## 2019-10-29 LAB
ALBUMIN SERPL BCP-MCNC: 4.4 G/DL (ref 3.5–5.2)
ALP SERPL-CCNC: 49 U/L (ref 55–135)
ALT SERPL W/O P-5'-P-CCNC: 47 U/L (ref 10–44)
ANION GAP SERPL CALC-SCNC: 8 MMOL/L (ref 8–16)
AST SERPL-CCNC: 33 U/L (ref 10–40)
BASOPHILS # BLD AUTO: 0.02 K/UL (ref 0–0.2)
BASOPHILS NFR BLD: 0.5 % (ref 0–1.9)
BILIRUB SERPL-MCNC: 0.7 MG/DL (ref 0.1–1)
BILIRUB UR QL STRIP: NEGATIVE
BUN SERPL-MCNC: 13 MG/DL (ref 6–20)
CALCIUM SERPL-MCNC: 9.1 MG/DL (ref 8.7–10.5)
CHLORIDE SERPL-SCNC: 102 MMOL/L (ref 95–110)
CHOLEST SERPL-MCNC: 168 MG/DL (ref 120–199)
CHOLEST/HDLC SERPL: 3.1 {RATIO} (ref 2–5)
CLARITY UR: CLEAR
CO2 SERPL-SCNC: 31 MMOL/L (ref 23–29)
COLOR UR: YELLOW
COMPLEXED PSA SERPL-MCNC: 0.7 NG/ML (ref 0–4)
CREAT SERPL-MCNC: 1 MG/DL (ref 0.5–1.4)
DIFFERENTIAL METHOD: ABNORMAL
EOSINOPHIL # BLD AUTO: 0.1 K/UL (ref 0–0.5)
EOSINOPHIL NFR BLD: 3.4 % (ref 0–8)
ERYTHROCYTE [DISTWIDTH] IN BLOOD BY AUTOMATED COUNT: 13.6 % (ref 11.5–14.5)
EST. GFR  (AFRICAN AMERICAN): >60 ML/MIN/1.73 M^2
EST. GFR  (NON AFRICAN AMERICAN): >60 ML/MIN/1.73 M^2
GLUCOSE SERPL-MCNC: 97 MG/DL (ref 70–110)
GLUCOSE UR QL STRIP: NEGATIVE
HCT VFR BLD AUTO: 42.4 % (ref 40–54)
HDLC SERPL-MCNC: 54 MG/DL (ref 40–75)
HDLC SERPL: 32.1 % (ref 20–50)
HGB BLD-MCNC: 13.9 G/DL (ref 14–18)
HGB UR QL STRIP: NEGATIVE
IMM GRANULOCYTES # BLD AUTO: 0 K/UL (ref 0–0.04)
IMM GRANULOCYTES NFR BLD AUTO: 0 % (ref 0–0.5)
KETONES UR QL STRIP: NEGATIVE
LDLC SERPL CALC-MCNC: 109.2 MG/DL (ref 63–159)
LEUKOCYTE ESTERASE UR QL STRIP: NEGATIVE
LYMPHOCYTES # BLD AUTO: 2 K/UL (ref 1–4.8)
LYMPHOCYTES NFR BLD: 51.7 % (ref 18–48)
MCH RBC QN AUTO: 27.6 PG (ref 27–31)
MCHC RBC AUTO-ENTMCNC: 32.8 G/DL (ref 32–36)
MCV RBC AUTO: 84 FL (ref 82–98)
MONOCYTES # BLD AUTO: 0.4 K/UL (ref 0.3–1)
MONOCYTES NFR BLD: 9.6 % (ref 4–15)
NEUTROPHILS # BLD AUTO: 1.3 K/UL (ref 1.8–7.7)
NEUTROPHILS NFR BLD: 34.8 % (ref 38–73)
NITRITE UR QL STRIP: NEGATIVE
NONHDLC SERPL-MCNC: 114 MG/DL
NRBC BLD-RTO: 0 /100 WBC
PH UR STRIP: 6 [PH] (ref 5–8)
PLATELET # BLD AUTO: 206 K/UL (ref 150–350)
PMV BLD AUTO: 10.2 FL (ref 9.2–12.9)
POTASSIUM SERPL-SCNC: 4.4 MMOL/L (ref 3.5–5.1)
PROT SERPL-MCNC: 8 G/DL (ref 6–8.4)
PROT UR QL STRIP: NEGATIVE
RBC # BLD AUTO: 5.03 M/UL (ref 4.6–6.2)
SODIUM SERPL-SCNC: 141 MMOL/L (ref 136–145)
SP GR UR STRIP: 1.02 (ref 1–1.03)
TRIGL SERPL-MCNC: 24 MG/DL (ref 30–150)
TSH SERPL DL<=0.005 MIU/L-ACNC: 1.13 UIU/ML (ref 0.34–5.6)
URN SPEC COLLECT METH UR: NORMAL
UROBILINOGEN UR STRIP-ACNC: NEGATIVE EU/DL
WBC # BLD AUTO: 3.85 K/UL (ref 3.9–12.7)

## 2019-10-29 PROCEDURE — 80061 LIPID PANEL: CPT

## 2019-10-29 PROCEDURE — 36415 COLL VENOUS BLD VENIPUNCTURE: CPT

## 2019-10-29 PROCEDURE — 99396 PREV VISIT EST AGE 40-64: CPT | Mod: 25,S$GLB,, | Performed by: NURSE PRACTITIONER

## 2019-10-29 PROCEDURE — 90471 FLU VACCINE (QUAD) GREATER THAN OR EQUAL TO 3YO PRESERVATIVE FREE IM: ICD-10-PCS | Mod: S$GLB,,, | Performed by: NURSE PRACTITIONER

## 2019-10-29 PROCEDURE — 90686 FLU VACCINE (QUAD) GREATER THAN OR EQUAL TO 3YO PRESERVATIVE FREE IM: ICD-10-PCS | Mod: S$GLB,,, | Performed by: NURSE PRACTITIONER

## 2019-10-29 PROCEDURE — 90471 IMMUNIZATION ADMIN: CPT | Mod: S$GLB,,, | Performed by: NURSE PRACTITIONER

## 2019-10-29 PROCEDURE — 81003 URINALYSIS AUTO W/O SCOPE: CPT

## 2019-10-29 PROCEDURE — 84443 ASSAY THYROID STIM HORMONE: CPT

## 2019-10-29 PROCEDURE — 99396 PR PREVENTIVE VISIT,EST,40-64: ICD-10-PCS | Mod: 25,S$GLB,, | Performed by: NURSE PRACTITIONER

## 2019-10-29 PROCEDURE — 85025 COMPLETE CBC W/AUTO DIFF WBC: CPT

## 2019-10-29 PROCEDURE — 84153 ASSAY OF PSA TOTAL: CPT

## 2019-10-29 PROCEDURE — 80053 COMPREHEN METABOLIC PANEL: CPT

## 2019-10-29 PROCEDURE — 90686 IIV4 VACC NO PRSV 0.5 ML IM: CPT | Mod: S$GLB,,, | Performed by: NURSE PRACTITIONER

## 2019-10-29 RX ORDER — SILDENAFIL 50 MG/1
50 TABLET, FILM COATED ORAL DAILY PRN
Qty: 9 TABLET | Refills: 5 | Status: SHIPPED | OUTPATIENT
Start: 2019-10-29 | End: 2022-09-15 | Stop reason: SDUPTHER

## 2019-10-29 NOTE — PATIENT INSTRUCTIONS
Oral Medicines for Erectile Dysfunction  You can use prescription oral medicine for ED. They often work well. But, like all medicines, they can have side effects. Also, you cant use them if you have certain health problems or take certain other medicines. Talk with your doctor about oral ED medicine. Ask whether it is right for you.  Types of oral ED medicines  There are three types of prescription oral ED medicines. Each one increases blood flow to the penis. When the penis is stimulated, an erection results. The types are:  · Sildenafil citrate   · Tadalafil   · Vardenafil  · Avanfil  What oral ED medicines dont do  There are some things ED medicines cant do.  · They dont cure the cause of your ED. Without the medicine, youll still have trouble getting an erection.  · They cant produce an erection without sexual stimulation.  · They wont increase sexual desire. They also wont solve any other sexual issues. Psychological, emotional, or relationship issues will not be fixed.  Taking oral ED medicines safely  · Do not combine ED medicines with other treatments unless your doctor tells you to.  · Dont take ED medicines more often or in larger doses than prescribed.  · Tell your doctor your health history. Mention all medicines you take. This includes over-the-counter drugs, supplements, and herbs.  · Ask your doctor about whether you can drink alcohol while taking ED medication.  Possible side effects of oral ED medicines  · Headache  · Facial flushing  · Runny or stuffy nose  · Indigestion  · Distortion of your color vision for a short time  · Sudden vision loss or hearing loss (rare)  · Dizziness  Risks of oral ED medicines  · Do not take ED medicines if you take medicines containing nitrates. The combination may drop your blood pressure to a dangerous level. Nitrates include nitroglycerin (a drug for angina or chest pain). They are also in poppers, an inhaled recreational drug. If youre not sure  whether youre taking nitrates, ask your doctor or pharmacist.  · Medicines called alpha-blockers can interact with ED medicines. They can cause a sudden drop in blood pressure. Alpha-blockers are a common treatment for prostate problems. They also treat high blood pressure. Be sure your doctor knows if you take these medicines.  · If youve had a heart attack or have heart disease and you have not had sex for a while, talk to your doctor. Having sex again can put extra strain on your heart. Your doctor can confirm that your heart is healthy enough for sex.  · It is rare, but some men taking ED medicines have had sudden vision loss. This may be more likely if other health problems are present. These include high blood pressure and diabetes. Ask your doctor if you are at risk for this type of vision loss.  · In rare cases, an erection may last too long. This can badly damage the blood vessels in your penis. If an erection lasts longer than 4 hours, go to the emergency room right away.   Date Last Reviewed: 1/1/2017 © 2000-2017 [employer]. [address] [place] [address]. All rights reserved. This information is not intended as a substitute for professional medical care. Always follow your healthcare professional's instructions.        Lifestyle Changes to Control Cholesterol  You can control your cholesterol through diet, exercise, weight management, quitting smoking, stress management, and taking your medicines right. These things can also lower your risk for cardiovascular disease.    Eating healthy  Your healthcare provider will give you information on diet changes you may need to make. Your provider may recommend that you see a registered dietitian for help with diet changes. Changes may include:  · Cutting back on the amount of fat and cholesterol in your meals  · Eating less salt (sodium). This is especially important if you have high blood pressure.  · Eating more fresh vegetables and  fruits  · Eating lean proteins such as fish, poultry, beans, and peas  · Eating less red meat and processed meats  · Using low-fat dairy products  · Using vegetable and nut oils in limited amounts  · Limiting how many sweets and processed foods like chips, cookies, and baked goods that you eat   · Limiting how many sugar-sweetened beverages you drink  · Limiting how often you eat out  Getting exercise  Regular exercise is a good way to help your body control cholesterol. Regular exercise can help in many ways. It can:  · Raise your good cholesterol  · Help lower your bad cholesterol  · Let blood flow better through your body  · Give more oxygen to your muscles and tissues  · Help you manage your weight  · Help your heart pump better  · Lower your blood pressure  Your healthcare provider may recommend that you get more physical activity if you haven't been active. Your provider may recommend that you get moderate to vigorous physical activity for at least 40 minutes each day. You should do this for at least 3 to 4 days each week. A few examples of moderate to vigorous activity are:  · Walking at a brisk pace. This is about 3 to 4 miles per hour.  · Jogging or running  · Swimming or water aerobics  · Hiking  · Dancing  · Martial arts  · Tennis  · Riding a bicycle or stationary bike  · Dancing  Managing your weight  If you are overweight or obese, your healthcare provider will work with you to help you lose weight and lower your BMI (body mass index). Making diet changes and getting more physical activity can help. Changing your diet will help you lose weight more easily than adding exercise.  Quitting smoking  Smoking and other tobacco use can raise cholesterol and make it harder to control. Quitting is tough. But millions of people have given up tobacco for good. You can quit, too! Think about some of the reasons below to quit smoking. Do any of them make you think twice about your smoking habit?  Stop smoking  because it:  · Keeps your cholesterol high, even if you make all the other changes youre supposed to  · Damages your body. It especially harms your heart, lungs, skin, and blood vessels.  · Makes you more likely to have a heart attack (acute myocardial infarction), stroke, or cancer  · Stains your teeth  · Makes your skin, clothes, and breath smell bad  · Costs a lot of money  Controlling stress   Learn ways to control stress. This will help you deal with stress in your home and work life. Controlling stress can greatly lower your risk of getting cardiovascular disease.  Making the most of medicines  Healthy eating and exercise are a good start to keeping your cholesterol down. But you may need some extra help from medicine. If your doctor prescribes medicine, be sure to take it exactly as directed. Remember:  · Tell your healthcare provider about all other medicines you take. This includes vitamins and herbs.  · Tell your healthcare provider if you have any side effects after starting to take a medicine. Examples of side effects to watch for include muscle aches, weakness, blurred vision, rust-colored urine, yellowing of eyes or skin (jaundice), and headache.  · Dont skip a dose or stop taking your medicine because you feel better or because your cholesterol numbers go down. Never stop taking your medicine unless your healthcare provider has told you its OK.  · Ask your healthcare provider if you have any questions about your medicines.  High risk groups  Some people may need to take medicines called statins to control their cholesterol. This is in addition to eating a healthy diet and getting regular exercise.  Statins can help you stay healthy. They can also help prevent a heart attack or stroke. You may need to take a statin if you are in one of these groups:  · Adults who have had a heart attack or stroke. Or adults who have had peripheral vascular disease, a ministroke (transient ischemic attack), or stable  or unstable angina. This group also includes people who have had a procedure to restore blood flow through a blocked artery. These procedures include percutaneous coronary intervention, angioplasty, stent, and open-heart bypass surgery.  · Adults who have diabetes. Or adults who are at higher risk of having a heart attack or stroke and have an LDL cholesterol level of 70 to 189 mg/dL  · Adults who are 21 years old or older and have an LDL cholesterol level of 190 mg/dL or higher.  If you are in a high-risk group, talk with your healthcare provider about your treatment goals. Make sure you understand why these goals are important, based on your own health history and your family history of heart disease or high cholesterol.  Make a plan to have regular cholesterol checks. You may need to fast before getting this test. Also ask your provider about any side effects your medicines may cause. Let your provider know about any side effects you have. You may need to take more than one medicine to reach the cholesterol goals that you and your provider decide on.  Date Last Reviewed: 10/1/2016  © 9746-7315 [employer]. [address] [place] [address]. All rights reserved. This information is not intended as a substitute for professional medical care. Always follow your healthcare professional's instructions.        Prevention Guidelines, Men Ages 50 to 64  Screening tests and vaccines are an important part of managing your health. Health counseling is essential, too. Below are guidelines for these, for men ages 50 to 64. Talk with your healthcare provider to make sure youre up-to-date on what you need.  Screening Who needs it How often   Alcohol misuse All men in this age group At routine exams   Blood pressure All men in this age group Every 2 years if your blood pressure is less than 120/80 mm Hg; yearly if your systolic blood pressure is 120 to 139 mm Hg, or your diastolic blood pressure reading is  80 to 89 mm Hg   Colorectal cancer All men in this age group Flexible sigmoidoscopy every 5 years, or colonoscopy every 10 years, or double-contrast barium enema every 5 years; yearly fecal occult blood test or fecal immunochemical test; or a stool DNA test as often as your healthcare provider advises; talk with your healthcare provider about which tests are best for you   Depression All men in this age group At routine exams   Type 2 diabetes or prediabetes All adults beginning at age 45 and adults without symptoms at any age who are overweight or obese and have 1 or more other risk factors for diabetes At least every 3 years (yearly if your blood sugar has already begun to rise)   Hepatitis C Men at increased risk for infection - talk with your healthcare provider At routine exams. All men ages 50 to 70 should be tested at least once for hepatitis C.   High cholesterol or triglycerides All men in this age group At least every 5 years   HIV Men at increased risk for infection - talk with your healthcare provider At routine exams   Lung cancer Adults age 55 to 80 who have smoked Yearly screening in smokers with 30 pack-year history of smoking or who quit within 15 years   Obesity All men in this age group At routine exams   Prostate cancer Starting at age 45, talk to healthcare provider about risks and benefits of digital rectal exam (RICKY) and prostate-specific antigen (PSA) screening1 At routine exams   Syphilis Men at increased risk for infection - talk with your healthcare provider At routine exams   Tuberculosis Men at increased risk for infection - talk with your healthcare provider Ask your healthcare provider   Vision All men in this age group Ask your healthcare provider   Vaccine Who needs it How often   Chickenpox (varicella) All men in this age group who have no record of this infection or vaccine 2 doses; second dose should be given at least 4 weeks after the first dose   Hepatitis A Men at increased  risk for infection - talk with your healthcare provider 2 doses given at least 6 months apart   Hepatitis B Men at increased risk for infection - talk with your healthcare provider 3 doses over 6 months; second dose should be given 1 month after the first dose; the third dose should be given at least 2 months after the second dose and at least 4 months after the first dose   Haemophilus influenzae Type B (HIB) Men at increased risk for infection - talk with your healthcare provider 1 to 3 doses   Influenza (flu) All men in this age group Once a year   Measles, mumps, rubella (MMR) Men in this age group through their late 50s who have no record of these infections or vaccines 1 or 2 doses; ask your healthcare provider   Meningococcal Men at increased risk for infection - talk with your healthcare provider 1 or more doses   Pneumococcal conjugate vaccine (PCV13) and pneumococcal polysaccharide vaccine (PPSV23) Men at increased risk for infection - talk with your healthcare provider PCV13: 1 dose ages 19 to 65 (protects against 13 types of pneumococcal bacteria)     PPSV23: 1 to 2 doses through age 64, or 1 dose at 65 or older (protects against 23 types of pneumococcal bacteria)      Tetanus/diphtheria/  pertussis (Td/Tdap) booster All men in this age group Td every 10 years, or a one-time dose of Tdap instead of a Td booster after age 18, then Td every 10 years   Zoster All men ages 60 and older 1 dose   Counseling Who needs it How often   Diet and exercise Men who are overweight or obese When diagnosed, and then at routine exams   Sexually transmitted infection prevention Men at increased risk for infection - talk with your healthcare provider At routine exams   Use of daily aspirin Men in this age group at risk for cardiovascular health problems At routine exams   Use of tobacco and the health effects it can cause All men in this age group Every visit   [address] [place] [address] [facility]  Date Last Reviewed:  2017  © 5045-1837 [employer]. [address] [place] [address], [place] [address]. All rights reserved. This information is not intended as a substitute for professional medical care. Always follow your healthcare professional's instructions.        Adult Immunization Schedule  Vaccine How Often Disease Prevented Who Needs It   Influenza Every year Flu. This can be especially dangerous to elderly adults or people with immune disorders. All adults   Tetanus, diphtheria (Td); or Tetanus, diphtheria, and pertussis (Tdap)* One dose of Tdap, then one dose of Td as a booster every 10 years Tetanus (lockjaw), a disease that causes muscles to spasm  Diphtheria, an infection that causes fever, weakness, and breathing problems  Pertussis, also known as whooping cough. This is a highly contagious disease that can cause serious illness. All adults  *This vaccine should be given during each pregnancy no matter how many years since the last vaccine. The vaccine increases protection for your . A  is too young to get the vaccine, but at the highest risk for severe illness and death from pertussis.   Varicella (Debbie)** One series of 2 injections Chickenpox. This is a disease that causes itchy skin bumps, fever, and tiredness. It can lead to scarring, pneumonia, or brain inflammation. Adults who dont have evidence of immunity  **This vaccine should not be given to pregnant women. Women should avoid pregnancy for 4 weeks after the vaccine.   Human papillomavirus (HPV) One series of 3 injections Cervical cancer, caused by certain types of HPV  Vaginal and vulvar cancer  Penile cancer  Head and neck cancers  Anal cancer  Genital warts Females and males ages 15 to 26  Ask your healthcare provider if this vaccine is right for you.   Zoster--- 1 dose Herpes zoster (shingles), a painful rash marked by blisters Adults ages 60 and older.  ---You should not get this vaccine if your immune system is low. For example, if  you have HIV, are taking medicines that suppress your immune system, or are getting cancer treatment.   Measles, mumps, rubella (MMR)** 1 or 2 doses, for ages 19 through 49; 1 dose for ages 50 and older if at risk Measles, a disease marked by red spots, fever, and coughing  Mumps, a disease that causes swelling in the salivary glands. It may affect the ovaries or testes.  Rubella (Faroese measles). This is a form of measles that can cause birth defects if a pregnant woman catches it. Adults born in 1957 or later who are not known to be immune to all 3 of these diseases. Ask your healthcare provider if you need a second dose.  **This vaccine should not be given to pregnant women. Women should avoid pregnancy for 4 weeks after vaccination.   Pneumococcal (PCV 13) 1 dose Pneumonia. This is an infection that causes inflammation in your lungs. It can lead to death. Adults ages 65 and older. Also, adults ages 19 and older with weak immune systems or any of these health conditions: chronic renal failure, nephrotic syndrome, functional or anatomic asplenia, cerebrospinal fluid (CSF) leaks, or cochlear implants. This vaccine adds extra protection to PCV 23 and should be given about 2 months before PCV 23.   Pneumococcal (PPSV23)  1 or 2 doses Pneumonia. This is an infection that causes inflammation in your lungs. It can lead to death. Adults ages 65 and older. Also, adults with chronic illnesses, such as asthma, COPD, heart disease, diabetes, liver disease, alcoholism, sickle cell disease, or history of splenectomy. In addition, adults with an immune disorder and adults who smoke cigarettes. This vaccine is recommended for all adults regardless of immune status.   Meningococcal  (MCV or MPSV) 1 or more doses Meningococcal disease (bacterial meningitis). This is inflammation of the membrane covering the brain and spinal cord. It can lead to death. Adults with immune deficiencies or high risk of exposure. Also, college freshmen  living in dormitories and  recruits.   Hepatitis A (HepA) One series of 2 injections Hepatitis A. This is an infection that can result in acute liver inflammation and yellow skin and whites of the eyes (jaundice). Adults with risk factors, such as clotting disorders or chronic liver disease, and adults with high risk of exposure. This includes men who have sex with men, IV drug users, and travelers to countries where hepatitis A is common.   Hepatitis B (HepB) One series of 3 injections Hepatitis B. This is an infection that causes chronic, severe liver disease. Adults with high risk of exposure, such as healthcare providers and sanitation workers, and adults with diabetes   Travelers diseases As needed Infections such as cholera, typhoid, yellow fever, polio, rabies, meningococcal disease, hepatitis A, hepatitis B Adults traveling out of the country. Required vaccines will vary, depending on the country you visit. Check the CDC website: www.cdc.gov.    ,  Based on the CDC National Immunization Program recommendations for adults.  Date Last Reviewed: 2/1/2017 © 2000-2017 [employer]. [address] [place] [address], [place] [address]. All rights reserved. This information is not intended as a substitute for professional medical care. Always follow your healthcare professional's instructions.

## 2019-10-29 NOTE — PROGRESS NOTES
SUBJECTIVE:   HPI: Abiodun Mora III  is a 54 y.o. male who presents for annual physical .   Annual Exam    Established patient of Dr. Mendez here for annual exam today. He has a PMH of HLD and has been taking his medication consistently as prescribed without SE or complaints. He does not smoke and he uses alcohol socially/intermittently (not daily). His colon cancer screening is UTD (2016- needs 10 yr repeat). He is due for screening labs today. He would like a flu shot today. He is having some problems with ED intermittently which he has had before. He previously took Cialis with great imporvement but notes the medication was too expensive.     Hyperlipidemia   This is a chronic problem. The current episode started more than 1 year ago. Condition status: needs labs  He has no history of chronic renal disease, diabetes, hypothyroidism, liver disease or obesity. There are no known factors aggravating his hyperlipidemia. Pertinent negatives include no chest pain, focal sensory loss, focal weakness, leg pain, myalgias or shortness of breath. Current antihyperlipidemic treatment includes statins, diet change and exercise. Compliance problems include adherence to exercise and adherence to diet.  Risk factors for coronary artery disease include hypertension, male sex and dyslipidemia.   Erectile Dysfunction   This is a recurrent problem. The current episode started more than 1 month ago. The problem has been waxing and waning since onset. The nature of his difficulty is maintaining erection. Non-physiologic factors contributing to erectile dysfunction are a decreased libido (mild ). He reports no anxiety or performance anxiety. Irritative symptoms do not include frequency, nocturia or urgency. Obstructive symptoms do not include dribbling, incomplete emptying, an intermittent stream, a slower stream, straining or a weak stream. Pertinent negatives include no chills, dysuria, genital pain, hematuria, hesitancy or  inability to urinate. Nothing aggravates the symptoms. Past treatments include tadalafil. The treatment provided significant relief. Duration of treatment: off med due to high cost  He has had no adverse reactions caused by medications. There are no known risk factors.       (Not in a hospital admission)  Review of patient's allergies indicates:  No Known Allergies  Current Outpatient Medications on File Prior to Visit   Medication Sig Dispense Refill    atorvastatin (LIPITOR) 20 MG tablet Take 1 tablet (20 mg total) by mouth once daily. 90 tablet 3    fluticasone propionate (FLONASE) 50 mcg/actuation nasal spray SPRAY 1 SPRAY INTO EACH NOSTRIL EVERY DAY 48 mL 3     No current facility-administered medications on file prior to visit.      Past Medical History:   Diagnosis Date    Allergy     Hyperlipidemia      Past Surgical History:   Procedure Laterality Date    COLONOSCOPY      2017    VASECTOMY  2000     Family History   Problem Relation Age of Onset    Cancer Mother         colon    Cancer Father         prostate     Social History     Tobacco Use    Smoking status: Never Smoker    Smokeless tobacco: Never Used   Substance Use Topics    Alcohol use: Yes    Drug use: No      Health Maintenance Topics with due status: Not Due       Topic Last Completion Date    Colonoscopy 11/28/2016    TETANUS VACCINE 06/29/2018    Lipid Panel 07/03/2018     Immunization History   Administered Date(s) Administered    Influenza - Quadrivalent - PF (6 months and older) 10/29/2019    Tdap 06/29/2018       Review of Systems   Constitutional: Negative for activity change, appetite change, chills, diaphoresis, fatigue, fever and unexpected weight change.   HENT: Negative for congestion, ear discharge, ear pain, rhinorrhea, sneezing, sore throat and trouble swallowing.    Eyes: Negative for photophobia, pain and visual disturbance.   Respiratory: Negative for apnea, cough, shortness of breath and wheezing.   "  Cardiovascular: Negative for chest pain, palpitations and leg swelling.   Gastrointestinal: Negative for abdominal pain, blood in stool, constipation, diarrhea, nausea and vomiting.        No GERD or heartburn    Endocrine: Negative for cold intolerance, heat intolerance, polydipsia and polyuria.   Genitourinary: Positive for decreased libido (mild ). Negative for difficulty urinating, dysuria, frequency, hematuria, hesitancy, incomplete emptying, nocturia, scrotal swelling, testicular pain and urgency.        Denies nocturia; had ED- intermittent    Musculoskeletal: Negative for arthralgias, back pain, joint swelling, myalgias and neck pain.   Skin: Negative for color change, rash and wound.   Allergic/Immunologic: Positive for environmental allergies.   Neurological: Negative for dizziness, focal weakness, syncope, weakness and headaches.   Hematological: Negative for adenopathy. Does not bruise/bleed easily.   Psychiatric/Behavioral: Negative for dysphoric mood, sleep disturbance and suicidal ideas. The patient is not nervous/anxious.       OBJECTIVE:      Vitals:    10/29/19 1100   BP: 120/68   BP Location: Left arm   Patient Position: Sitting   BP Method: X-Large (Manual)   Pulse: 64   Temp: 98 °F (36.7 °C)   TempSrc: Oral   SpO2: 96%   Weight: 85.7 kg (189 lb)   Height: 5' 10" (1.778 m)     Physical Exam   Constitutional: He is oriented to person, place, and time. He appears well-developed and well-nourished. No distress.   HENT:   Head: Normocephalic and atraumatic.   Right Ear: Tympanic membrane, external ear and ear canal normal.   Left Ear: Tympanic membrane, external ear and ear canal normal.   Nose: Nose normal.   Mouth/Throat: Uvula is midline, oropharynx is clear and moist and mucous membranes are normal. No oropharyngeal exudate. No tonsillar exudate.   Eyes: Pupils are equal, round, and reactive to light. Conjunctivae and EOM are normal. No scleral icterus.   Neck: Normal range of motion. Neck " supple. No JVD present. No tracheal deviation present. No thyromegaly present.   Cardiovascular: Normal rate, regular rhythm, normal heart sounds and intact distal pulses. Exam reveals no gallop and no friction rub.   No murmur heard.  Pulmonary/Chest: Effort normal and breath sounds normal. No respiratory distress. He has no wheezes. He has no rales.   Abdominal: Soft. Bowel sounds are normal. He exhibits no distension. There is no tenderness.   Musculoskeletal: Normal range of motion. He exhibits no edema.   Lymphadenopathy:     He has no cervical adenopathy.   Neurological: He is alert and oriented to person, place, and time.   Skin: Skin is warm and dry. Capillary refill takes less than 2 seconds. No rash noted. He is not diaphoretic.   Psychiatric: He has a normal mood and affect. His behavior is normal. Judgment and thought content normal.   Nursing note and vitals reviewed.     Assessment:       1. Encounter for routine adult health examination without abnormal findings    2. Mixed hyperlipidemia    3. Erectile dysfunction, unspecified erectile dysfunction type    4. Immunization due    5. Prostate cancer screening    6. Body mass index (bmi) 27.0-27.9, adult        Plan:       Encounter for routine adult health examination without abnormal findings  -     CBC auto differential; Future; Expected date: 10/29/2019  -     TSH; Future; Expected date: 10/29/2019  -     Urinalysis; Future; Expected date: 10/29/2019    Mixed hyperlipidemia  -     Comprehensive metabolic panel; Future; Expected date: 10/29/2019  -     Lipid panel; Future; Expected date: 10/29/2019    *Limit red meat, butter, fried foods, cheese, and other foods that have a lot of saturated fat. Consume more: lean meats, fish, fruits, vegetables, whole grains, beans, lentils, and nuts.  Weight loss, and 30-45 min of cardiovascular exercise daily.     Erectile dysfunction, unspecified erectile dysfunction type  -     TSH; Future; Expected date:  10/29/2019  -     PSA, Screening; Future; Expected date: 10/29/2019  -     sildenafil (VIAGRA) 50 MG tablet; Take 1 tablet (50 mg total) by mouth daily as needed for Erectile Dysfunction.  Dispense: 9 tablet; Refill: 5    *will start at 50 mg dose and take as needed; if he has no significant improvement, we may need to check further hormones- he verbalized understanding of POC  *Side effects of new medication discussed with patient; understanding voiced.    Immunization due  -     Influenza - Quadrivalent (PF)    Prostate cancer screening  -     PSA, Screening; Future; Expected date: 10/29/2019    Body mass index (bmi) 27.0-27.9, adult        Counseled on age and gender appropriate medical preventative services, including cancer screenings, immunizations, overall nutritional health, need for a consistent exercise regimen and an overall push towards maintaining a vigorous and active lifestyle.      Follow up in about 6 months (around 4/29/2020) for f/u ED.

## 2019-12-11 DIAGNOSIS — E78.2 MIXED HYPERLIPIDEMIA: ICD-10-CM

## 2019-12-11 RX ORDER — ATORVASTATIN CALCIUM 20 MG/1
20 TABLET, FILM COATED ORAL DAILY
Qty: 90 TABLET | Refills: 0 | Status: SHIPPED | OUTPATIENT
Start: 2019-12-11 | End: 2020-02-05

## 2020-02-05 DIAGNOSIS — E78.2 MIXED HYPERLIPIDEMIA: ICD-10-CM

## 2020-02-05 RX ORDER — ATORVASTATIN CALCIUM 20 MG/1
TABLET, FILM COATED ORAL
Qty: 30 TABLET | Refills: 2 | Status: SHIPPED | OUTPATIENT
Start: 2020-02-05 | End: 2020-05-06

## 2020-05-06 DIAGNOSIS — E78.2 MIXED HYPERLIPIDEMIA: ICD-10-CM

## 2020-05-06 RX ORDER — ATORVASTATIN CALCIUM 20 MG/1
TABLET, FILM COATED ORAL
Qty: 90 TABLET | Refills: 1 | Status: SHIPPED | OUTPATIENT
Start: 2020-05-06 | End: 2020-12-20

## 2020-05-29 ENCOUNTER — OFFICE VISIT (OUTPATIENT)
Dept: FAMILY MEDICINE | Facility: CLINIC | Age: 55
End: 2020-05-29
Payer: COMMERCIAL

## 2020-05-29 VITALS
OXYGEN SATURATION: 97 % | HEART RATE: 59 BPM | DIASTOLIC BLOOD PRESSURE: 76 MMHG | TEMPERATURE: 98 F | WEIGHT: 186 LBS | SYSTOLIC BLOOD PRESSURE: 136 MMHG | BODY MASS INDEX: 26.63 KG/M2 | HEIGHT: 70 IN

## 2020-05-29 DIAGNOSIS — Z00.00 PREVENTATIVE HEALTH CARE: ICD-10-CM

## 2020-05-29 DIAGNOSIS — E78.2 MIXED HYPERLIPIDEMIA: ICD-10-CM

## 2020-05-29 DIAGNOSIS — Z12.5 ENCOUNTER FOR PROSTATE CANCER SCREENING: Primary | ICD-10-CM

## 2020-05-29 PROCEDURE — 99213 OFFICE O/P EST LOW 20 MIN: CPT | Mod: S$GLB,,, | Performed by: FAMILY MEDICINE

## 2020-05-29 PROCEDURE — 3008F BODY MASS INDEX DOCD: CPT | Mod: S$GLB,,, | Performed by: FAMILY MEDICINE

## 2020-05-29 PROCEDURE — 99213 PR OFFICE/OUTPT VISIT, EST, LEVL III, 20-29 MIN: ICD-10-PCS | Mod: S$GLB,,, | Performed by: FAMILY MEDICINE

## 2020-05-29 PROCEDURE — 3008F PR BODY MASS INDEX (BMI) DOCUMENTED: ICD-10-PCS | Mod: S$GLB,,, | Performed by: FAMILY MEDICINE

## 2020-05-29 NOTE — PROGRESS NOTES
Subjective:       Patient ID: Abiodun Mora III is a 54 y.o. male.    Chief Complaint: Follow-up (ed)      Patient is here for six-month follow-up on cholesterol he did have lab work done at his annual visit in October.  Lab Results       Component                Value               Date                       WBC                      3.85 (L)            10/29/2019                 HGB                      13.9 (L)            10/29/2019                 HCT                      42.4                10/29/2019                 PLT                      206                 10/29/2019                 CHOL                     168                 10/29/2019                 TRIG                     24 (L)              10/29/2019                 HDL                      54                  10/29/2019                 ALT                      47 (H)              10/29/2019                 AST                      33                  10/29/2019                 NA                       141                 10/29/2019                 K                        4.4                 10/29/2019                 CL                       102                 10/29/2019                 CREATININE               1.0                 10/29/2019                 BUN                      13                  10/29/2019                 CO2                      31 (H)              10/29/2019                 TSH                      1.130               10/29/2019                 PSA                      0.70                10/29/2019                 HGBA1C                   5.7                 07/03/2018            Lab Results       Component                Value               Date                       CHOL                     168                 10/29/2019            Lab Results       Component                Value               Date                       HDL                      54                  10/29/2019            Lab Results       Component                 Value               Date                       LDLCALC                  109.2               10/29/2019            Lab Results       Component                Value               Date                       TRIG                     24 (L)              10/29/2019            Lab Results       Component                Value               Date                       CHOLHDL                  32.1                10/29/2019            Chol      Hyperlipidemia   This is a chronic problem. The problem is controlled. He has no history of diabetes.       Allergies and Medications:   Review of patient's allergies indicates:  No Known Allergies  Current Outpatient Medications   Medication Sig Dispense Refill    atorvastatin (LIPITOR) 20 MG tablet TAKE 1 TABLET BY MOUTH EVERY DAY 90 tablet 1    fluticasone propionate (FLONASE) 50 mcg/actuation nasal spray SPRAY 1 SPRAY INTO EACH NOSTRIL EVERY DAY 48 mL 3    sildenafil (VIAGRA) 50 MG tablet Take 1 tablet (50 mg total) by mouth daily as needed for Erectile Dysfunction. (Patient not taking: Reported on 5/29/2020) 9 tablet 5     No current facility-administered medications for this visit.        Family History:   Family History   Problem Relation Age of Onset    Cancer Mother         colon    Cancer Father         prostate       Social History:   Social History     Socioeconomic History    Marital status:      Spouse name: Not on file    Number of children: Not on file    Years of education: Not on file    Highest education level: Not on file   Occupational History    Not on file   Social Needs    Financial resource strain: Not on file    Food insecurity:     Worry: Not on file     Inability: Not on file    Transportation needs:     Medical: Not on file     Non-medical: Not on file   Tobacco Use    Smoking status: Never Smoker    Smokeless tobacco: Never Used   Substance and Sexual Activity    Alcohol use: Yes    Drug use: No    Sexual activity: Not  on file   Lifestyle    Physical activity:     Days per week: Not on file     Minutes per session: Not on file    Stress: Not at all   Relationships    Social connections:     Talks on phone: Not on file     Gets together: Not on file     Attends Buddhism service: Not on file     Active member of club or organization: Not on file     Attends meetings of clubs or organizations: Not on file     Relationship status: Not on file   Other Topics Concern    Not on file   Social History Narrative    Not on file       Review of Systems    Objective:     Vitals:    05/29/20 1402   BP: 136/76   Pulse: (!) 59   Temp: 98.3 °F (36.8 °C)        Physical Exam   Constitutional: He appears well-developed and well-nourished.   HENT:   Head: Normocephalic.   Eyes: Pupils are equal, round, and reactive to light. Conjunctivae and EOM are normal.   Cardiovascular: Normal rate, regular rhythm, normal heart sounds and intact distal pulses. Exam reveals no gallop and no friction rub.   No murmur heard.  Pulmonary/Chest: Effort normal and breath sounds normal. No stridor. No respiratory distress. He has no wheezes. He has no rales. He exhibits no tenderness.   Skin: Skin is warm and dry. He is not diaphoretic.   Psychiatric: He has a normal mood and affect. His behavior is normal. Judgment and thought content normal.   Nursing note and vitals reviewed.      Assessment:       1. Encounter for prostate cancer screening    2. Preventative health care    3. Mixed hyperlipidemia        Plan:       Abiodun was seen today for follow-up.    Diagnoses and all orders for this visit:    Encounter for prostate cancer screening  -     PSA, Screening; Future    Preventative health care  -     Hemoglobin A1C; Future  -     HIV 1/2 Ag/Ab (4th Gen); Future    Mixed hyperlipidemia  -     Lipid Panel; Future  -     Comprehensive metabolic panel; Future         Follow up in about 5 months (around 10/29/2020) for follow up cholesterol.

## 2021-03-12 ENCOUNTER — OFFICE VISIT (OUTPATIENT)
Dept: FAMILY MEDICINE | Facility: CLINIC | Age: 56
End: 2021-03-12
Payer: COMMERCIAL

## 2021-03-12 VITALS
WEIGHT: 189 LBS | BODY MASS INDEX: 27.06 KG/M2 | TEMPERATURE: 98 F | DIASTOLIC BLOOD PRESSURE: 74 MMHG | HEART RATE: 78 BPM | SYSTOLIC BLOOD PRESSURE: 136 MMHG | HEIGHT: 70 IN | OXYGEN SATURATION: 98 %

## 2021-03-12 DIAGNOSIS — R05.9 COUGH: Primary | ICD-10-CM

## 2021-03-12 DIAGNOSIS — U07.1 COVID-19: ICD-10-CM

## 2021-03-12 PROCEDURE — 99213 PR OFFICE/OUTPT VISIT, EST, LEVL III, 20-29 MIN: ICD-10-PCS | Mod: S$GLB,,, | Performed by: FAMILY MEDICINE

## 2021-03-12 PROCEDURE — 99213 OFFICE O/P EST LOW 20 MIN: CPT | Mod: S$GLB,,, | Performed by: FAMILY MEDICINE

## 2021-03-12 RX ORDER — FLUTICASONE PROPIONATE 50 MCG
1 SPRAY, SUSPENSION (ML) NASAL DAILY
Qty: 16 G | Refills: 11 | Status: SHIPPED | OUTPATIENT
Start: 2021-03-12 | End: 2022-03-12

## 2021-03-18 ENCOUNTER — IMMUNIZATION (OUTPATIENT)
Dept: PRIMARY CARE CLINIC | Facility: CLINIC | Age: 56
End: 2021-03-18

## 2021-03-18 DIAGNOSIS — Z23 NEED FOR VACCINATION: Primary | ICD-10-CM

## 2021-03-18 PROCEDURE — 0001A COVID-19, MRNA, LNP-S, PF, 30 MCG/0.3 ML DOSE VACCINE: CPT | Mod: CV19,S$GLB,, | Performed by: FAMILY MEDICINE

## 2021-03-18 PROCEDURE — 91300 COVID-19, MRNA, LNP-S, PF, 30 MCG/0.3 ML DOSE VACCINE: ICD-10-PCS | Mod: S$GLB,,, | Performed by: FAMILY MEDICINE

## 2021-03-18 PROCEDURE — 91300 COVID-19, MRNA, LNP-S, PF, 30 MCG/0.3 ML DOSE VACCINE: CPT | Mod: S$GLB,,, | Performed by: FAMILY MEDICINE

## 2021-03-18 PROCEDURE — 0001A COVID-19, MRNA, LNP-S, PF, 30 MCG/0.3 ML DOSE VACCINE: ICD-10-PCS | Mod: CV19,S$GLB,, | Performed by: FAMILY MEDICINE

## 2021-03-26 ENCOUNTER — HOSPITAL ENCOUNTER (OUTPATIENT)
Dept: RADIOLOGY | Facility: HOSPITAL | Age: 56
Discharge: HOME | End: 2021-03-26
Attending: FAMILY MEDICINE
Payer: COMMERCIAL

## 2021-03-26 ENCOUNTER — TELEPHONE (OUTPATIENT)
Dept: FAMILY MEDICINE | Facility: CLINIC | Age: 56
End: 2021-03-26

## 2021-03-26 DIAGNOSIS — U07.1 COVID-19: ICD-10-CM

## 2021-03-26 PROCEDURE — 71046 X-RAY EXAM CHEST 2 VIEWS: CPT | Mod: TC,PO

## 2021-04-08 ENCOUNTER — IMMUNIZATION (OUTPATIENT)
Dept: PRIMARY CARE CLINIC | Facility: CLINIC | Age: 56
End: 2021-04-08

## 2021-04-08 DIAGNOSIS — Z23 NEED FOR VACCINATION: Primary | ICD-10-CM

## 2021-04-08 PROCEDURE — 91300 COVID-19, MRNA, LNP-S, PF, 30 MCG/0.3 ML DOSE VACCINE: ICD-10-PCS | Mod: S$GLB,,, | Performed by: FAMILY MEDICINE

## 2021-04-08 PROCEDURE — 0002A COVID-19, MRNA, LNP-S, PF, 30 MCG/0.3 ML DOSE VACCINE: CPT | Mod: CV19,S$GLB,, | Performed by: FAMILY MEDICINE

## 2021-04-08 PROCEDURE — 0002A COVID-19, MRNA, LNP-S, PF, 30 MCG/0.3 ML DOSE VACCINE: ICD-10-PCS | Mod: CV19,S$GLB,, | Performed by: FAMILY MEDICINE

## 2021-04-08 PROCEDURE — 91300 COVID-19, MRNA, LNP-S, PF, 30 MCG/0.3 ML DOSE VACCINE: CPT | Mod: S$GLB,,, | Performed by: FAMILY MEDICINE

## 2022-06-29 ENCOUNTER — OCCUPATIONAL HEALTH (OUTPATIENT)
Dept: URGENT CARE | Facility: CLINIC | Age: 57
End: 2022-06-29

## 2022-06-29 PROCEDURE — 99499 UNLISTED E&M SERVICE: CPT | Mod: S$GLB,,, | Performed by: NURSE PRACTITIONER

## 2022-06-29 PROCEDURE — 99499 PR PHYSICAL - DOT/CDL: ICD-10-PCS | Mod: S$GLB,,, | Performed by: NURSE PRACTITIONER

## 2022-09-15 ENCOUNTER — OFFICE VISIT (OUTPATIENT)
Dept: FAMILY MEDICINE | Facility: CLINIC | Age: 57
End: 2022-09-15
Payer: COMMERCIAL

## 2022-09-15 ENCOUNTER — LAB VISIT (OUTPATIENT)
Dept: LAB | Facility: HOSPITAL | Age: 57
End: 2022-09-15
Attending: FAMILY MEDICINE
Payer: COMMERCIAL

## 2022-09-15 ENCOUNTER — TELEPHONE (OUTPATIENT)
Dept: FAMILY MEDICINE | Facility: CLINIC | Age: 57
End: 2022-09-15

## 2022-09-15 VITALS
RESPIRATION RATE: 18 BRPM | HEART RATE: 83 BPM | SYSTOLIC BLOOD PRESSURE: 110 MMHG | HEIGHT: 70 IN | TEMPERATURE: 98 F | DIASTOLIC BLOOD PRESSURE: 68 MMHG | WEIGHT: 188.81 LBS | OXYGEN SATURATION: 98 % | BODY MASS INDEX: 27.03 KG/M2

## 2022-09-15 DIAGNOSIS — E78.2 MIXED HYPERLIPIDEMIA: ICD-10-CM

## 2022-09-15 DIAGNOSIS — N52.9 ERECTILE DYSFUNCTION, UNSPECIFIED ERECTILE DYSFUNCTION TYPE: ICD-10-CM

## 2022-09-15 DIAGNOSIS — Z00.00 PREVENTATIVE HEALTH CARE: Primary | ICD-10-CM

## 2022-09-15 DIAGNOSIS — Z00.00 PREVENTATIVE HEALTH CARE: ICD-10-CM

## 2022-09-15 DIAGNOSIS — Z88.9 H/O SEASONAL ALLERGIES: ICD-10-CM

## 2022-09-15 DIAGNOSIS — Z12.5 ENCOUNTER FOR PROSTATE CANCER SCREENING: ICD-10-CM

## 2022-09-15 LAB
ALBUMIN SERPL BCP-MCNC: 4.6 G/DL (ref 3.5–5.2)
ALP SERPL-CCNC: 60 U/L (ref 55–135)
ALT SERPL W/O P-5'-P-CCNC: 29 U/L (ref 10–44)
ANION GAP SERPL CALC-SCNC: 6 MMOL/L (ref 8–16)
AST SERPL-CCNC: 26 U/L (ref 10–40)
BILIRUB SERPL-MCNC: 1.2 MG/DL (ref 0.1–1)
BUN SERPL-MCNC: 13 MG/DL (ref 6–20)
CALCIUM SERPL-MCNC: 9.5 MG/DL (ref 8.7–10.5)
CHLORIDE SERPL-SCNC: 104 MMOL/L (ref 95–110)
CHOLEST SERPL-MCNC: 214 MG/DL (ref 120–199)
CHOLEST/HDLC SERPL: 3.6 {RATIO} (ref 2–5)
CO2 SERPL-SCNC: 30 MMOL/L (ref 23–29)
COMPLEXED PSA SERPL-MCNC: 0.94 NG/ML (ref 0–4)
CREAT SERPL-MCNC: 1.2 MG/DL (ref 0.5–1.4)
EST. GFR  (NO RACE VARIABLE): >60 ML/MIN/1.73 M^2
GLUCOSE SERPL-MCNC: 98 MG/DL (ref 70–110)
HDLC SERPL-MCNC: 59 MG/DL (ref 40–75)
HDLC SERPL: 27.6 % (ref 20–50)
LDLC SERPL CALC-MCNC: 142.4 MG/DL (ref 63–159)
NONHDLC SERPL-MCNC: 155 MG/DL
POTASSIUM SERPL-SCNC: 5.1 MMOL/L (ref 3.5–5.1)
PROT SERPL-MCNC: 8.4 G/DL (ref 6–8.4)
SODIUM SERPL-SCNC: 140 MMOL/L (ref 136–145)
TRIGL SERPL-MCNC: 63 MG/DL (ref 30–150)

## 2022-09-15 PROCEDURE — 86803 HEPATITIS C AB TEST: CPT | Performed by: FAMILY MEDICINE

## 2022-09-15 PROCEDURE — 80061 LIPID PANEL: CPT | Performed by: FAMILY MEDICINE

## 2022-09-15 PROCEDURE — 84153 ASSAY OF PSA TOTAL: CPT | Performed by: FAMILY MEDICINE

## 2022-09-15 PROCEDURE — 1159F PR MEDICATION LIST DOCUMENTED IN MEDICAL RECORD: ICD-10-PCS | Mod: CPTII,S$GLB,, | Performed by: FAMILY MEDICINE

## 2022-09-15 PROCEDURE — 3074F SYST BP LT 130 MM HG: CPT | Mod: CPTII,S$GLB,, | Performed by: FAMILY MEDICINE

## 2022-09-15 PROCEDURE — 3074F PR MOST RECENT SYSTOLIC BLOOD PRESSURE < 130 MM HG: ICD-10-PCS | Mod: CPTII,S$GLB,, | Performed by: FAMILY MEDICINE

## 2022-09-15 PROCEDURE — 3078F DIAST BP <80 MM HG: CPT | Mod: CPTII,S$GLB,, | Performed by: FAMILY MEDICINE

## 2022-09-15 PROCEDURE — 3008F BODY MASS INDEX DOCD: CPT | Mod: CPTII,S$GLB,, | Performed by: FAMILY MEDICINE

## 2022-09-15 PROCEDURE — 36415 COLL VENOUS BLD VENIPUNCTURE: CPT | Performed by: FAMILY MEDICINE

## 2022-09-15 PROCEDURE — 80053 COMPREHEN METABOLIC PANEL: CPT | Performed by: FAMILY MEDICINE

## 2022-09-15 PROCEDURE — 87389 HIV-1 AG W/HIV-1&-2 AB AG IA: CPT | Performed by: FAMILY MEDICINE

## 2022-09-15 PROCEDURE — 3008F PR BODY MASS INDEX (BMI) DOCUMENTED: ICD-10-PCS | Mod: CPTII,S$GLB,, | Performed by: FAMILY MEDICINE

## 2022-09-15 PROCEDURE — 1159F MED LIST DOCD IN RCRD: CPT | Mod: CPTII,S$GLB,, | Performed by: FAMILY MEDICINE

## 2022-09-15 PROCEDURE — 99396 PREV VISIT EST AGE 40-64: CPT | Mod: S$GLB,,, | Performed by: FAMILY MEDICINE

## 2022-09-15 PROCEDURE — 3078F PR MOST RECENT DIASTOLIC BLOOD PRESSURE < 80 MM HG: ICD-10-PCS | Mod: CPTII,S$GLB,, | Performed by: FAMILY MEDICINE

## 2022-09-15 PROCEDURE — 99396 PR PREVENTIVE VISIT,EST,40-64: ICD-10-PCS | Mod: S$GLB,,, | Performed by: FAMILY MEDICINE

## 2022-09-15 RX ORDER — ATORVASTATIN CALCIUM 20 MG/1
20 TABLET, FILM COATED ORAL DAILY
Qty: 90 TABLET | Refills: 1 | Status: SHIPPED | OUTPATIENT
Start: 2022-09-15 | End: 2023-05-01

## 2022-09-15 RX ORDER — SILDENAFIL 50 MG/1
50 TABLET, FILM COATED ORAL DAILY PRN
Qty: 9 TABLET | Refills: 5 | Status: SHIPPED | OUTPATIENT
Start: 2022-09-15 | End: 2023-09-15

## 2022-09-15 RX ORDER — FLUTICASONE PROPIONATE 50 MCG
1 SPRAY, SUSPENSION (ML) NASAL DAILY
Qty: 48 ML | Refills: 3 | Status: SHIPPED | OUTPATIENT
Start: 2022-09-15

## 2022-09-15 NOTE — PROGRESS NOTES
Subjective:       Patient ID: Abiodun Mora III is a 57 y.o. male.    Chief Complaint: Annual Exam (Annual )      For his annual well visit today.  Patient Active Problem List:     Vasculogenic erectile dysfunction     Neck pain     Vocal cord strain     Hyperlipidemia     Erectile dysfunction     Immunization due   Lab Results       Component                Value               Date                       WBC                      3.85 (L)            10/29/2019                 HGB                      13.9 (L)            10/29/2019                 HCT                      42.4                10/29/2019                 PLT                      206                 10/29/2019                 CHOL                     168                 10/29/2019                 TRIG                     24 (L)              10/29/2019                 HDL                      54                  10/29/2019                 ALT                      47 (H)              10/29/2019                 AST                      33                  10/29/2019                 NA                       141                 10/29/2019                 K                        4.4                 10/29/2019                 CL                       102                 10/29/2019                 CREATININE               1.0                 10/29/2019                 BUN                      13                  10/29/2019                 CO2                      31 (H)              10/29/2019                 TSH                      1.130               10/29/2019                 PSA                      0.70                10/29/2019                 HGBA1C                   5.7                 07/03/2018                          Allergies and Medications:   Review of patient's allergies indicates:  No Known Allergies  Current Outpatient Medications   Medication Sig Dispense Refill    atorvastatin (LIPITOR) 20 MG tablet Take 1 tablet (20 mg total) by mouth  once daily. 90 tablet 1    fluticasone propionate (FLONASE) 50 mcg/actuation nasal spray 1 spray (50 mcg total) by Each Nostril route once daily. 48 mL 3    sildenafiL (VIAGRA) 50 MG tablet Take 1 tablet (50 mg total) by mouth daily as needed for Erectile Dysfunction. 9 tablet 5     No current facility-administered medications for this visit.       Family History:   Family History   Problem Relation Age of Onset    Cancer Mother         colon    Cancer Father         prostate       Social History:   Social History     Socioeconomic History    Marital status:    Tobacco Use    Smoking status: Never    Smokeless tobacco: Never   Substance and Sexual Activity    Alcohol use: Yes    Drug use: No       Review of Systems   Constitutional:  Negative for activity change, appetite change, chills, diaphoresis, fatigue, fever and unexpected weight change.   HENT:  Negative for congestion, dental problem, drooling, ear discharge, ear pain, facial swelling, hearing loss, mouth sores, nosebleeds, postnasal drip, rhinorrhea, sinus pressure, sinus pain, sneezing, sore throat, tinnitus, trouble swallowing and voice change.    Eyes:  Negative for photophobia, pain, discharge, redness, itching and visual disturbance.   Respiratory:  Negative for apnea, cough, choking, chest tightness, shortness of breath, wheezing and stridor.    Cardiovascular:  Negative for chest pain, palpitations and leg swelling.        Strong family history of cardiac events.   Gastrointestinal:  Negative for abdominal distention, abdominal pain, anal bleeding, blood in stool, constipation, diarrhea, nausea, rectal pain and vomiting.   Endocrine: Negative for cold intolerance, heat intolerance, polydipsia, polyphagia and polyuria.   Genitourinary:  Negative for decreased urine volume, difficulty urinating, dysuria, enuresis, flank pain, frequency, genital sores, hematuria, penile discharge, penile pain, penile swelling, scrotal swelling, testicular pain  and urgency.        No nocturia by history.   Musculoskeletal:  Negative for arthralgias, back pain, gait problem, joint swelling, myalgias, neck pain and neck stiffness.   Skin:  Negative for color change, pallor, rash and wound.   Allergic/Immunologic: Negative for environmental allergies, food allergies and immunocompromised state.   Neurological:  Negative for dizziness, tremors, seizures, syncope, facial asymmetry, speech difficulty, weakness, light-headedness, numbness and headaches.   Hematological:  Negative for adenopathy. Does not bruise/bleed easily.   Psychiatric/Behavioral:  Negative for agitation, behavioral problems, confusion, decreased concentration, dysphoric mood, hallucinations, self-injury, sleep disturbance and suicidal ideas. The patient is not nervous/anxious and is not hyperactive.      Objective:     Vitals:    09/15/22 1019   BP: 110/68   Pulse: 83   Resp: 18   Temp: 98.2 °F (36.8 °C)        Physical Exam  Vitals and nursing note reviewed.   Constitutional:       General: He is not in acute distress.     Appearance: Normal appearance. He is well-developed and normal weight. He is not ill-appearing, toxic-appearing or diaphoretic.   HENT:      Head: Normocephalic and atraumatic.      Right Ear: Tympanic membrane, ear canal and external ear normal. There is no impacted cerumen.      Left Ear: Tympanic membrane, ear canal and external ear normal. There is no impacted cerumen.      Nose: Nose normal. No congestion or rhinorrhea.      Mouth/Throat:      Mouth: Mucous membranes are moist.      Pharynx: Oropharynx is clear. No oropharyngeal exudate or posterior oropharyngeal erythema.   Eyes:      General: No scleral icterus.        Right eye: No discharge.         Left eye: No discharge.      Extraocular Movements: Extraocular movements intact.      Conjunctiva/sclera: Conjunctivae normal.      Pupils: Pupils are equal, round, and reactive to light.   Neck:      Thyroid: No thyromegaly.       Vascular: No carotid bruit or JVD.      Trachea: No tracheal deviation.   Cardiovascular:      Rate and Rhythm: Normal rate.      Heart sounds: Normal heart sounds. No murmur heard.    No friction rub. No gallop.   Pulmonary:      Effort: Pulmonary effort is normal. No respiratory distress.      Breath sounds: Normal breath sounds. No stridor. No wheezing, rhonchi or rales.   Chest:      Chest wall: No tenderness.   Abdominal:      General: Abdomen is flat. Bowel sounds are normal. There is no distension.      Palpations: Abdomen is soft. There is no mass.      Tenderness: There is no abdominal tenderness. There is no right CVA tenderness, left CVA tenderness, guarding or rebound.      Hernia: No hernia is present.   Genitourinary:     Penis: Normal and circumcised. No phimosis, paraphimosis, hypospadias, erythema, tenderness or discharge.       Testes: Normal. Cremasteric reflex is present.         Right: Mass, tenderness or swelling not present. Right testis is descended. Cremasteric reflex is present.          Left: Mass, tenderness or swelling not present. Left testis is descended. Cremasteric reflex is present.       Rectum: Normal.   Musculoskeletal:         General: No swelling, tenderness, deformity or signs of injury.      Cervical back: Normal range of motion and neck supple. No rigidity or tenderness. No muscular tenderness.      Right lower leg: No edema.      Left lower leg: No edema.   Lymphadenopathy:      Cervical: No cervical adenopathy.   Skin:     General: Skin is warm and dry.      Capillary Refill: Capillary refill takes less than 2 seconds.      Coloration: Skin is not jaundiced or pale.      Findings: No bruising, erythema, lesion or rash.   Neurological:      General: No focal deficit present.      Mental Status: He is alert and oriented to person, place, and time.      Cranial Nerves: No cranial nerve deficit.      Sensory: No sensory deficit.      Motor: No weakness or abnormal muscle tone.       Coordination: Coordination normal.      Gait: Gait normal.      Deep Tendon Reflexes: Reflexes are normal and symmetric. Reflexes normal.   Psychiatric:         Mood and Affect: Mood normal.         Behavior: Behavior normal.         Thought Content: Thought content normal.         Judgment: Judgment normal.       Assessment:       1. Preventative health care    2. Erectile dysfunction, unspecified erectile dysfunction type    3. Mixed hyperlipidemia    4. H/O seasonal allergies    5. Encounter for prostate cancer screening        Plan:       Abiodun was seen today for annual exam.    Diagnoses and all orders for this visit:    Preventative health care  -     HIV 1/2 Ag/Ab (4th Gen); Future  -     Hepatitis C Antibody; Future    Erectile dysfunction, unspecified erectile dysfunction type  -     sildenafiL (VIAGRA) 50 MG tablet; Take 1 tablet (50 mg total) by mouth daily as needed for Erectile Dysfunction.    Mixed hyperlipidemia  -     atorvastatin (LIPITOR) 20 MG tablet; Take 1 tablet (20 mg total) by mouth once daily.  -     Lipid Panel; Future  -     Comprehensive Metabolic Panel; Future    H/O seasonal allergies  -     fluticasone propionate (FLONASE) 50 mcg/actuation nasal spray; 1 spray (50 mcg total) by Each Nostril route once daily.    Encounter for prostate cancer screening  -     PSA, Screening; Future       Follow up in about 6 months (around 3/15/2023) for follow up cholesterol.

## 2022-09-15 NOTE — PROGRESS NOTES
Cholesterol has bumped up to 214 as has the LDL gone up to 142.  Take the Lipitor as prescribed and will recheck in 3 months.

## 2022-09-15 NOTE — TELEPHONE ENCOUNTER
----- Message from Saul Mendez MD sent at 9/15/2022  2:37 PM CDT -----  Cholesterol has bumped up to 214 as has the LDL gone up to 142.  Take the Lipitor as prescribed and will recheck in 3 months.

## 2022-09-16 ENCOUNTER — TELEPHONE (OUTPATIENT)
Dept: FAMILY MEDICINE | Facility: CLINIC | Age: 57
End: 2022-09-16

## 2022-09-16 LAB
HCV AB S/CO SERPL IA: <0.1 S/CO RATIO (ref 0–0.9)
HIV 1+2 AB+HIV1 P24 AG SERPL QL IA: NON REACTIVE

## 2022-09-16 NOTE — TELEPHONE ENCOUNTER
----- Message from Saul Mendez MD sent at 9/16/2022  8:16 AM CDT -----  Results Ok, notify patient.

## 2022-12-27 ENCOUNTER — IMMUNIZATION (OUTPATIENT)
Dept: PRIMARY CARE CLINIC | Facility: CLINIC | Age: 57
End: 2022-12-27
Payer: COMMERCIAL

## 2022-12-27 DIAGNOSIS — Z23 NEED FOR VACCINATION: Primary | ICD-10-CM

## 2022-12-27 PROCEDURE — 91312 COVID-19, MRNA, LNP-S, BIVALENT BOOSTER, PF, 30 MCG/0.3 ML DOSE: ICD-10-PCS | Mod: S$GLB,,, | Performed by: FAMILY MEDICINE

## 2022-12-27 PROCEDURE — 0124A COVID-19, MRNA, LNP-S, BIVALENT BOOSTER, PF, 30 MCG/0.3 ML DOSE: CPT | Mod: S$GLB,,, | Performed by: FAMILY MEDICINE

## 2022-12-27 PROCEDURE — 0124A COVID-19, MRNA, LNP-S, BIVALENT BOOSTER, PF, 30 MCG/0.3 ML DOSE: ICD-10-PCS | Mod: S$GLB,,, | Performed by: FAMILY MEDICINE

## 2022-12-27 PROCEDURE — 91312 COVID-19, MRNA, LNP-S, BIVALENT BOOSTER, PF, 30 MCG/0.3 ML DOSE: CPT | Mod: S$GLB,,, | Performed by: FAMILY MEDICINE

## 2023-01-17 ENCOUNTER — OFFICE VISIT (OUTPATIENT)
Dept: FAMILY MEDICINE | Facility: CLINIC | Age: 58
End: 2023-01-17
Payer: COMMERCIAL

## 2023-01-17 VITALS — OXYGEN SATURATION: 98 % | HEIGHT: 70 IN | WEIGHT: 187.5 LBS | BODY MASS INDEX: 26.84 KG/M2 | RESPIRATION RATE: 18 BRPM

## 2023-01-17 DIAGNOSIS — R05.9 COUGH, UNSPECIFIED TYPE: ICD-10-CM

## 2023-01-17 DIAGNOSIS — J06.9 UPPER RESPIRATORY TRACT INFECTION, UNSPECIFIED TYPE: Primary | ICD-10-CM

## 2023-01-17 LAB
CTP QC/QA: YES
CTP QC/QA: YES
POC MOLECULAR INFLUENZA A AGN: NEGATIVE
POC MOLECULAR INFLUENZA B AGN: NEGATIVE
SARS-COV-2 RDRP RESP QL NAA+PROBE: NEGATIVE

## 2023-01-17 PROCEDURE — 87635: ICD-10-PCS | Mod: QW,S$GLB,, | Performed by: NURSE PRACTITIONER

## 2023-01-17 PROCEDURE — 87635 SARS-COV-2 COVID-19 AMP PRB: CPT | Mod: QW,S$GLB,, | Performed by: NURSE PRACTITIONER

## 2023-01-17 PROCEDURE — 3008F BODY MASS INDEX DOCD: CPT | Mod: CPTII,S$GLB,, | Performed by: NURSE PRACTITIONER

## 2023-01-17 PROCEDURE — 99213 PR OFFICE/OUTPT VISIT, EST, LEVL III, 20-29 MIN: ICD-10-PCS | Mod: S$GLB,,, | Performed by: NURSE PRACTITIONER

## 2023-01-17 PROCEDURE — 1159F MED LIST DOCD IN RCRD: CPT | Mod: CPTII,S$GLB,, | Performed by: NURSE PRACTITIONER

## 2023-01-17 PROCEDURE — 87502 INFLUENZA DNA AMP PROBE: CPT | Mod: QW,S$GLB,, | Performed by: NURSE PRACTITIONER

## 2023-01-17 PROCEDURE — 1159F PR MEDICATION LIST DOCUMENTED IN MEDICAL RECORD: ICD-10-PCS | Mod: CPTII,S$GLB,, | Performed by: NURSE PRACTITIONER

## 2023-01-17 PROCEDURE — 87502 POCT INFLUENZA A/B MOLECULAR: ICD-10-PCS | Mod: QW,S$GLB,, | Performed by: NURSE PRACTITIONER

## 2023-01-17 PROCEDURE — 99213 OFFICE O/P EST LOW 20 MIN: CPT | Mod: S$GLB,,, | Performed by: NURSE PRACTITIONER

## 2023-01-17 PROCEDURE — 3008F PR BODY MASS INDEX (BMI) DOCUMENTED: ICD-10-PCS | Mod: CPTII,S$GLB,, | Performed by: NURSE PRACTITIONER

## 2023-01-17 RX ORDER — AZITHROMYCIN 250 MG/1
TABLET, FILM COATED ORAL
Qty: 6 TABLET | Refills: 0 | Status: SHIPPED | OUTPATIENT
Start: 2023-01-17 | End: 2023-01-22

## 2023-01-17 RX ORDER — PROMETHAZINE HYDROCHLORIDE AND DEXTROMETHORPHAN HYDROBROMIDE 6.25; 15 MG/5ML; MG/5ML
5 SYRUP ORAL EVERY 6 HOURS PRN
Qty: 200 ML | Refills: 0 | Status: SHIPPED | OUTPATIENT
Start: 2023-01-17 | End: 2023-01-27

## 2023-01-17 RX ORDER — BENZONATATE 100 MG/1
100 CAPSULE ORAL 3 TIMES DAILY PRN
Qty: 30 CAPSULE | Refills: 0 | Status: SHIPPED | OUTPATIENT
Start: 2023-01-17 | End: 2023-01-27

## 2023-01-17 NOTE — PROGRESS NOTES
Patient ID: Abiodun Mora III is a 57 y.o. male.    Chief Complaint: Cough, Fever, and Nasal Congestion    Mr. Mora presents today for evaluation of upper respiratory infection. He states he had a fever of 100 F last night and he took otc fever reducer and he is now afebrile in clinic. He states he has a cough and his primary symptom is chest congestion. He denies chills but does state he had some body aches. He denies any sick contact. He denies any other issues or complaints     Cough  This is a new problem. The current episode started in the past 7 days. The problem has been waxing and waning. The problem occurs every few minutes. The cough is Productive of purulent sputum. Associated symptoms include ear congestion, a fever, nasal congestion and rhinorrhea. Pertinent negatives include no chest pain, headaches, myalgias, postnasal drip, rash, sore throat, shortness of breath or wheezing. Nothing aggravates the symptoms. He has tried nothing for the symptoms. The treatment provided no relief. There is no history of environmental allergies.   Fever   This is a new problem. The current episode started yesterday. The problem occurs intermittently. The problem has been unchanged. The maximum temperature noted was 100 to 100.9 F. The temperature was taken using an oral thermometer. Associated symptoms include coughing. Pertinent negatives include no abdominal pain, chest pain, congestion, diarrhea, headaches, nausea, rash, sore throat, vomiting or wheezing. He has tried acetaminophen for the symptoms. The treatment provided moderate relief.         Past Medical History:   Diagnosis Date    Allergy     Hyperlipidemia      Past Surgical History:   Procedure Laterality Date    COLONOSCOPY      2017    VASECTOMY  2000         Tobacco History:  reports that he has never smoked. He has never used smokeless tobacco.      Review of patient's allergies indicates:  No Known Allergies    Current Outpatient  Medications:     atorvastatin (LIPITOR) 20 MG tablet, Take 1 tablet (20 mg total) by mouth once daily., Disp: 90 tablet, Rfl: 1    fluticasone propionate (FLONASE) 50 mcg/actuation nasal spray, 1 spray (50 mcg total) by Each Nostril route once daily., Disp: 48 mL, Rfl: 3    sildenafiL (VIAGRA) 50 MG tablet, Take 1 tablet (50 mg total) by mouth daily as needed for Erectile Dysfunction., Disp: 9 tablet, Rfl: 5    azithromycin (Z-RANDA) 250 MG tablet, Take 2 tablets by mouth on day 1; Take 1 tablet by mouth on days 2-5, Disp: 6 tablet, Rfl: 0    benzonatate (TESSALON) 100 MG capsule, Take 1 capsule (100 mg total) by mouth 3 (three) times daily as needed for Cough., Disp: 30 capsule, Rfl: 0    promethazine-dextromethorphan (PROMETHAZINE-DM) 6.25-15 mg/5 mL Syrp, Take 5 mLs by mouth every 6 (six) hours as needed (cough)., Disp: 200 mL, Rfl: 0    Review of Systems   Constitutional:  Positive for fever. Negative for activity change, appetite change and fatigue.   HENT:  Positive for rhinorrhea. Negative for congestion, dental problem, nosebleeds, postnasal drip, sinus pain, sore throat and tinnitus.    Eyes:  Negative for pain, discharge, itching and visual disturbance.   Respiratory:  Positive for cough. Negative for chest tightness, shortness of breath and wheezing.    Cardiovascular:  Negative for chest pain.   Gastrointestinal:  Negative for abdominal pain, blood in stool, constipation, diarrhea, nausea and vomiting.   Endocrine: Negative for cold intolerance, heat intolerance, polydipsia, polyphagia and polyuria.   Genitourinary:  Negative for difficulty urinating, flank pain, genital sores, hematuria and urgency.   Musculoskeletal:  Negative for arthralgias and myalgias.   Skin:  Negative for rash and wound.   Allergic/Immunologic: Negative for environmental allergies and food allergies.   Neurological:  Negative for dizziness, light-headedness and headaches.   Hematological:  Negative for adenopathy. Does not  "bruise/bleed easily.   Psychiatric/Behavioral:  Negative for behavioral problems, confusion, decreased concentration, sleep disturbance and suicidal ideas. The patient is not nervous/anxious and is not hyperactive.         Objective:      Vitals:    01/17/23 1109   Resp: 18   SpO2: 98%   Weight: 85 kg (187 lb 8 oz)   Height: 5' 10" (1.778 m)     Physical Exam  Vitals reviewed.   Constitutional:       General: He is not in acute distress.     Appearance: Normal appearance. He is normal weight. He is ill-appearing. He is not toxic-appearing or diaphoretic.   HENT:      Head: Normocephalic and atraumatic.      Right Ear: Tympanic membrane and external ear normal. There is no impacted cerumen.      Left Ear: Tympanic membrane and external ear normal. There is no impacted cerumen.      Nose: Nose normal. No congestion or rhinorrhea.      Mouth/Throat:      Mouth: Mucous membranes are moist.      Pharynx: Oropharynx is clear. No oropharyngeal exudate or posterior oropharyngeal erythema.   Eyes:      General: No scleral icterus.        Right eye: No discharge.         Left eye: No discharge.      Extraocular Movements: Extraocular movements intact.      Conjunctiva/sclera: Conjunctivae normal.      Pupils: Pupils are equal, round, and reactive to light.   Cardiovascular:      Rate and Rhythm: Normal rate and regular rhythm.      Pulses: Normal pulses.      Heart sounds: Normal heart sounds. No murmur heard.    No friction rub. No gallop.   Pulmonary:      Effort: Pulmonary effort is normal. No respiratory distress.      Breath sounds: Normal breath sounds. No wheezing, rhonchi or rales.   Chest:      Chest wall: No tenderness.   Abdominal:      General: Abdomen is flat. Bowel sounds are normal.      Palpations: Abdomen is soft. There is no mass.      Tenderness: There is no abdominal tenderness. There is no guarding or rebound.   Musculoskeletal:         General: No swelling or signs of injury. Normal range of motion.      " Cervical back: Normal range of motion and neck supple. No rigidity or tenderness.      Right lower leg: No edema.      Left lower leg: No edema.   Skin:     General: Skin is warm and dry.      Capillary Refill: Capillary refill takes less than 2 seconds.      Coloration: Skin is not pale.      Findings: No bruising or erythema.   Neurological:      General: No focal deficit present.      Mental Status: He is alert and oriented to person, place, and time. Mental status is at baseline.      Motor: No weakness.      Coordination: Coordination normal.      Gait: Gait normal.   Psychiatric:         Mood and Affect: Mood normal.         Behavior: Behavior normal.         Thought Content: Thought content normal.         Judgment: Judgment normal.         Assessment:       1. Upper respiratory tract infection, unspecified type    2. Cough, unspecified type           Plan:       Upper respiratory tract infection, unspecified type  -     POCT COVID-19 Rapid Screening  -     POCT Influenza A/B Molecular  -     azithromycin (Z-RANDA) 250 MG tablet; Take 2 tablets by mouth on day 1; Take 1 tablet by mouth on days 2-5  Dispense: 6 tablet; Refill: 0    Cough, unspecified type  -     benzonatate (TESSALON) 100 MG capsule; Take 1 capsule (100 mg total) by mouth 3 (three) times daily as needed for Cough.  Dispense: 30 capsule; Refill: 0  -     promethazine-dextromethorphan (PROMETHAZINE-DM) 6.25-15 mg/5 mL Syrp; Take 5 mLs by mouth every 6 (six) hours as needed (cough).  Dispense: 200 mL; Refill: 0      Follow up if symptoms worsen or fail to improve.        1/17/2023 Carie Jiménez NP

## 2023-01-25 ENCOUNTER — OCCUPATIONAL HEALTH (OUTPATIENT)
Dept: URGENT CARE | Facility: CLINIC | Age: 58
End: 2023-01-25
Payer: COMMERCIAL

## 2023-01-25 DIAGNOSIS — Z13.9 ENCOUNTER FOR SCREENING: Primary | ICD-10-CM

## 2023-01-25 LAB — COLLECTION ONLY: NORMAL

## 2023-01-25 PROCEDURE — 80305 OOH COLLECTION ONLY DRUG SCREEN: ICD-10-PCS | Mod: S$GLB,,, | Performed by: NURSE PRACTITIONER

## 2023-01-25 PROCEDURE — 80305 DRUG TEST PRSMV DIR OPT OBS: CPT | Mod: S$GLB,,, | Performed by: NURSE PRACTITIONER

## 2023-02-06 DIAGNOSIS — R05.9 COUGH, UNSPECIFIED TYPE: Primary | ICD-10-CM

## 2023-02-06 RX ORDER — AZITHROMYCIN 1 G/1
1 POWDER, FOR SUSPENSION ORAL ONCE
Qty: 1 PACKET | Refills: 0 | OUTPATIENT
Start: 2023-02-06 | End: 2023-02-06

## 2023-02-06 NOTE — TELEPHONE ENCOUNTER
Patient  states he is still having cough and congestion.  Could he get another Z-pack. He was prescribed one on 1-17-23 by JOVANY Ramirez.

## 2023-02-13 ENCOUNTER — TELEPHONE (OUTPATIENT)
Dept: FAMILY MEDICINE | Facility: CLINIC | Age: 58
End: 2023-02-13

## 2023-03-01 ENCOUNTER — OFFICE VISIT (OUTPATIENT)
Dept: FAMILY MEDICINE | Facility: CLINIC | Age: 58
End: 2023-03-01
Payer: COMMERCIAL

## 2023-03-01 ENCOUNTER — LAB VISIT (OUTPATIENT)
Dept: LAB | Facility: HOSPITAL | Age: 58
End: 2023-03-01
Attending: FAMILY MEDICINE
Payer: COMMERCIAL

## 2023-03-01 ENCOUNTER — HOSPITAL ENCOUNTER (OUTPATIENT)
Dept: RADIOLOGY | Facility: HOSPITAL | Age: 58
Discharge: HOME | End: 2023-03-01
Attending: FAMILY MEDICINE
Payer: COMMERCIAL

## 2023-03-01 ENCOUNTER — TELEPHONE (OUTPATIENT)
Dept: FAMILY MEDICINE | Facility: CLINIC | Age: 58
End: 2023-03-01

## 2023-03-01 VITALS
OXYGEN SATURATION: 99 % | BODY MASS INDEX: 27.2 KG/M2 | HEART RATE: 88 BPM | WEIGHT: 190 LBS | HEIGHT: 70 IN | SYSTOLIC BLOOD PRESSURE: 128 MMHG | DIASTOLIC BLOOD PRESSURE: 78 MMHG | RESPIRATION RATE: 16 BRPM | TEMPERATURE: 98 F

## 2023-03-01 DIAGNOSIS — J40 BRONCHITIS: ICD-10-CM

## 2023-03-01 DIAGNOSIS — Z00.00 PREVENTATIVE HEALTH CARE: ICD-10-CM

## 2023-03-01 DIAGNOSIS — J32.9 SINUSITIS, UNSPECIFIED CHRONICITY, UNSPECIFIED LOCATION: Primary | ICD-10-CM

## 2023-03-01 DIAGNOSIS — E78.2 MIXED HYPERLIPIDEMIA: ICD-10-CM

## 2023-03-01 DIAGNOSIS — J32.9 SINUSITIS, UNSPECIFIED CHRONICITY, UNSPECIFIED LOCATION: ICD-10-CM

## 2023-03-01 LAB
CTP QC/QA: YES
ESTIMATED AVG GLUCOSE: 126 MG/DL (ref 68–131)
HBA1C MFR BLD: 6 % (ref 4.5–6.2)
SARS-COV-2 RDRP RESP QL NAA+PROBE: NEGATIVE

## 2023-03-01 PROCEDURE — 1159F MED LIST DOCD IN RCRD: CPT | Mod: CPTII,S$GLB,, | Performed by: FAMILY MEDICINE

## 2023-03-01 PROCEDURE — 99214 OFFICE O/P EST MOD 30 MIN: CPT | Mod: S$GLB,,, | Performed by: FAMILY MEDICINE

## 2023-03-01 PROCEDURE — 71046 X-RAY EXAM CHEST 2 VIEWS: CPT | Mod: TC

## 2023-03-01 PROCEDURE — 70220 X-RAY EXAM OF SINUSES: CPT | Mod: TC

## 2023-03-01 PROCEDURE — 3008F PR BODY MASS INDEX (BMI) DOCUMENTED: ICD-10-PCS | Mod: CPTII,S$GLB,, | Performed by: FAMILY MEDICINE

## 2023-03-01 PROCEDURE — 36415 COLL VENOUS BLD VENIPUNCTURE: CPT | Performed by: FAMILY MEDICINE

## 2023-03-01 PROCEDURE — 3078F DIAST BP <80 MM HG: CPT | Mod: CPTII,S$GLB,, | Performed by: FAMILY MEDICINE

## 2023-03-01 PROCEDURE — 83036 HEMOGLOBIN GLYCOSYLATED A1C: CPT | Performed by: FAMILY MEDICINE

## 2023-03-01 PROCEDURE — 99214 PR OFFICE/OUTPT VISIT, EST, LEVL IV, 30-39 MIN: ICD-10-PCS | Mod: S$GLB,,, | Performed by: FAMILY MEDICINE

## 2023-03-01 PROCEDURE — 87635: ICD-10-PCS | Mod: QW,S$GLB,, | Performed by: FAMILY MEDICINE

## 2023-03-01 PROCEDURE — 1159F PR MEDICATION LIST DOCUMENTED IN MEDICAL RECORD: ICD-10-PCS | Mod: CPTII,S$GLB,, | Performed by: FAMILY MEDICINE

## 2023-03-01 PROCEDURE — 3078F PR MOST RECENT DIASTOLIC BLOOD PRESSURE < 80 MM HG: ICD-10-PCS | Mod: CPTII,S$GLB,, | Performed by: FAMILY MEDICINE

## 2023-03-01 PROCEDURE — 3074F PR MOST RECENT SYSTOLIC BLOOD PRESSURE < 130 MM HG: ICD-10-PCS | Mod: CPTII,S$GLB,, | Performed by: FAMILY MEDICINE

## 2023-03-01 PROCEDURE — 3008F BODY MASS INDEX DOCD: CPT | Mod: CPTII,S$GLB,, | Performed by: FAMILY MEDICINE

## 2023-03-01 PROCEDURE — 3074F SYST BP LT 130 MM HG: CPT | Mod: CPTII,S$GLB,, | Performed by: FAMILY MEDICINE

## 2023-03-01 PROCEDURE — 87635 SARS-COV-2 COVID-19 AMP PRB: CPT | Mod: QW,S$GLB,, | Performed by: FAMILY MEDICINE

## 2023-03-01 RX ORDER — AMOXICILLIN AND CLAVULANATE POTASSIUM 500; 125 MG/1; MG/1
1 TABLET, FILM COATED ORAL 2 TIMES DAILY
Qty: 20 TABLET | Refills: 0 | Status: SHIPPED | OUTPATIENT
Start: 2023-03-01 | End: 2023-03-11

## 2023-03-01 RX ORDER — PROMETHAZINE HYDROCHLORIDE AND DEXTROMETHORPHAN HYDROBROMIDE 6.25; 15 MG/5ML; MG/5ML
10 SYRUP ORAL NIGHTLY
Qty: 180 ML | Refills: 2 | Status: SHIPPED | OUTPATIENT
Start: 2023-03-01 | End: 2023-03-11

## 2023-03-01 NOTE — PROGRESS NOTES
Subjective:       Patient ID: Abiodun Mora III is a 57 y.o. male.    Chief Complaint: Cough and Sore Throat      Comes in today because of persistent cough sore throat and right ear congestion has been going on for month.    Cough  This is a chronic problem. The current episode started more than 1 month ago. The problem has been unchanged. The problem occurs constantly. The cough is Productive of purulent sputum. Associated symptoms include ear congestion, heartburn, nasal congestion, postnasal drip, rhinorrhea, a sore throat and shortness of breath. Pertinent negatives include no chills, ear pain, fever or headaches. Nothing aggravates the symptoms. Risk factors for lung disease include animal exposure. He has tried OTC cough suppressant and prescription cough suppressant (finished z pack) for the symptoms.   Sore Throat   This is a chronic problem. The problem has been unchanged. There has been no fever. The pain is mild. Associated symptoms include coughing and shortness of breath. Pertinent negatives include no abdominal pain, congestion, ear pain or headaches.     Allergies and Medications:   Review of patient's allergies indicates:  No Known Allergies  Current Outpatient Medications   Medication Sig Dispense Refill    atorvastatin (LIPITOR) 20 MG tablet Take 1 tablet (20 mg total) by mouth once daily. 90 tablet 1    fluticasone propionate (FLONASE) 50 mcg/actuation nasal spray 1 spray (50 mcg total) by Each Nostril route once daily. 48 mL 3    sildenafiL (VIAGRA) 50 MG tablet Take 1 tablet (50 mg total) by mouth daily as needed for Erectile Dysfunction. 9 tablet 5    amoxicillin-clavulanate 500-125mg (AUGMENTIN) 500-125 mg Tab Take 1 tablet (500 mg total) by mouth 2 (two) times daily. for 10 days 20 tablet 0     No current facility-administered medications for this visit.       Family History:   Family History   Problem Relation Age of Onset    Cancer Mother         colon    Cancer Father          prostate       Social History:   Social History     Socioeconomic History    Marital status:    Tobacco Use    Smoking status: Never    Smokeless tobacco: Never   Substance and Sexual Activity    Alcohol use: Yes    Drug use: No       Review of Systems   Constitutional:  Negative for chills and fever.   HENT:  Positive for postnasal drip, rhinorrhea and sore throat. Negative for congestion and ear pain.    Respiratory:  Positive for cough and shortness of breath.    Gastrointestinal:  Positive for heartburn. Negative for abdominal pain.   Neurological:  Negative for headaches.     Objective:     Vitals:    03/01/23 0913   BP: 128/78   Pulse: 88   Resp: 16   Temp: 98.4 °F (36.9 °C)        Physical Exam  Vitals and nursing note reviewed.   Constitutional:       General: He is not in acute distress.     Appearance: Normal appearance. He is well-developed and normal weight. He is not ill-appearing, toxic-appearing or diaphoretic.   HENT:      Head: Normocephalic and atraumatic.      Right Ear: Hearing, tympanic membrane, ear canal and external ear normal. No decreased hearing noted. No drainage, swelling or tenderness. No middle ear effusion. There is no impacted cerumen. No foreign body. No hemotympanum. Tympanic membrane is not injected, scarred, perforated, erythematous, retracted or bulging. Tympanic membrane has normal mobility.      Left Ear: Hearing, tympanic membrane, ear canal and external ear normal. No decreased hearing noted. No drainage, swelling or tenderness.  No middle ear effusion. There is no impacted cerumen. No foreign body. No hemotympanum. Tympanic membrane is not injected, scarred, perforated, erythematous, retracted or bulging. Tympanic membrane has normal mobility.      Nose: Nose normal. No nasal deformity, septal deviation, laceration, mucosal edema, congestion or rhinorrhea.      Right Sinus: No maxillary sinus tenderness or frontal sinus tenderness.      Left Sinus: No maxillary sinus  tenderness or frontal sinus tenderness.      Mouth/Throat:      Dentition: Normal dentition.      Pharynx: Uvula midline. No oropharyngeal exudate or posterior oropharyngeal erythema.      Tonsils: No tonsillar abscesses.   Eyes:      General: No scleral icterus.        Right eye: No discharge.         Left eye: No discharge.      Conjunctiva/sclera: Conjunctivae normal.      Pupils: Pupils are equal, round, and reactive to light.   Neck:      Thyroid: No thyromegaly.      Vascular: No carotid bruit.   Cardiovascular:      Rate and Rhythm: Normal rate and regular rhythm.      Heart sounds: Normal heart sounds. No murmur heard.    No friction rub. No gallop.   Pulmonary:      Effort: Pulmonary effort is normal. No respiratory distress.      Breath sounds: Normal breath sounds. No stridor. No wheezing, rhonchi or rales.   Chest:      Chest wall: No tenderness.   Musculoskeletal:      Cervical back: Normal range of motion and neck supple. No rigidity or tenderness.   Lymphadenopathy:      Cervical: No cervical adenopathy.   Neurological:      Mental Status: He is alert.     P.o. CT COVID is negative.  Assessment:       1. Sinusitis, unspecified chronicity, unspecified location    2. Preventative health care    3. Bronchitis    4. Mixed hyperlipidemia        Plan:       Abiodun was seen today for cough and sore throat.    Diagnoses and all orders for this visit:    Sinusitis, unspecified chronicity, unspecified location  -     POCT COVID-19 Rapid Screening  -     X-Ray Sinuses Min 3 Views; Future  -     amoxicillin-clavulanate 500-125mg (AUGMENTIN) 500-125 mg Tab; Take 1 tablet (500 mg total) by mouth 2 (two) times daily. for 10 days    Preventative health care  -     Hemoglobin A1C; Future    Bronchitis  -     X-Ray Chest PA And Lateral; Future    Mixed hyperlipidemia         Follow up in about 6 months (around 9/1/2023) for follow up HTN.

## 2023-03-01 NOTE — TELEPHONE ENCOUNTER
----- Message from Saul Mendez MD sent at 3/1/2023  1:06 PM CST -----  Lab reviewed: all OK. Please notify pt. Continue pres meds.

## 2023-03-02 ENCOUNTER — TELEPHONE (OUTPATIENT)
Dept: FAMILY MEDICINE | Facility: CLINIC | Age: 58
End: 2023-03-02

## 2023-03-02 NOTE — TELEPHONE ENCOUNTER
----- Message from Saul Mendez MD sent at 3/1/2023  4:29 PM CST -----  Results Ok, notify patient.

## 2023-04-29 DIAGNOSIS — E78.2 MIXED HYPERLIPIDEMIA: ICD-10-CM

## 2023-05-01 RX ORDER — ATORVASTATIN CALCIUM 20 MG/1
TABLET, FILM COATED ORAL
Qty: 90 TABLET | Refills: 1 | Status: SHIPPED | OUTPATIENT
Start: 2023-05-01 | End: 2024-03-27 | Stop reason: SDUPTHER

## 2023-06-05 PROBLEM — Z00.00 PREVENTATIVE HEALTH CARE: Status: RESOLVED | Noted: 2018-06-29 | Resolved: 2023-06-05

## 2024-03-14 NOTE — TELEPHONE ENCOUNTER
APPT. 8/2/24, LOV. 3/1/23.    Left phone message for patient to make an appointment to keep his refills going.

## 2024-03-18 ENCOUNTER — OFFICE VISIT (OUTPATIENT)
Dept: FAMILY MEDICINE | Facility: CLINIC | Age: 59
End: 2024-03-18
Payer: COMMERCIAL

## 2024-03-18 VITALS
HEART RATE: 73 BPM | SYSTOLIC BLOOD PRESSURE: 120 MMHG | OXYGEN SATURATION: 98 % | HEIGHT: 70 IN | BODY MASS INDEX: 27.35 KG/M2 | RESPIRATION RATE: 18 BRPM | DIASTOLIC BLOOD PRESSURE: 82 MMHG | WEIGHT: 191 LBS | TEMPERATURE: 98 F

## 2024-03-18 DIAGNOSIS — R73.01 IFG (IMPAIRED FASTING GLUCOSE): ICD-10-CM

## 2024-03-18 DIAGNOSIS — N52.9 ERECTILE DYSFUNCTION, UNSPECIFIED ERECTILE DYSFUNCTION TYPE: Primary | ICD-10-CM

## 2024-03-18 DIAGNOSIS — R19.00 ABDOMINAL WALL MASS OF LEFT FLANK: ICD-10-CM

## 2024-03-18 DIAGNOSIS — N52.9 VASCULOGENIC ERECTILE DYSFUNCTION, UNSPECIFIED VASCULOGENIC ERECTILE DYSFUNCTION TYPE: ICD-10-CM

## 2024-03-18 DIAGNOSIS — Z12.5 ENCOUNTER FOR PROSTATE CANCER SCREENING: ICD-10-CM

## 2024-03-18 DIAGNOSIS — E78.2 MIXED HYPERLIPIDEMIA: ICD-10-CM

## 2024-03-18 PROCEDURE — 99999 PR PBB SHADOW E&M-EST. PATIENT-LVL IV: CPT | Mod: PBBFAC,,, | Performed by: FAMILY MEDICINE

## 2024-03-18 PROCEDURE — 3008F BODY MASS INDEX DOCD: CPT | Mod: CPTII,S$GLB,, | Performed by: FAMILY MEDICINE

## 2024-03-18 PROCEDURE — 3079F DIAST BP 80-89 MM HG: CPT | Mod: CPTII,S$GLB,, | Performed by: FAMILY MEDICINE

## 2024-03-18 PROCEDURE — 3074F SYST BP LT 130 MM HG: CPT | Mod: CPTII,S$GLB,, | Performed by: FAMILY MEDICINE

## 2024-03-18 PROCEDURE — 99214 OFFICE O/P EST MOD 30 MIN: CPT | Mod: S$GLB,,, | Performed by: FAMILY MEDICINE

## 2024-03-18 PROCEDURE — 1159F MED LIST DOCD IN RCRD: CPT | Mod: CPTII,S$GLB,, | Performed by: FAMILY MEDICINE

## 2024-03-18 RX ORDER — TADALAFIL 20 MG/1
20 TABLET ORAL DAILY
Qty: 30 TABLET | Refills: 11 | Status: SHIPPED | OUTPATIENT
Start: 2024-03-18 | End: 2025-03-18

## 2024-03-18 NOTE — PATIENT INSTRUCTIONS
We understand that controlling diabetes can feel overwhelming at times. We are here to offer the tools and support to help you manage your diabetes and meet your health goals. Please reference the meal planning guide below.     Diabetic Meal Planning    About this topic  Healthy eating is an important part of keeping your diabetes in control. A balanced diet along with your diabetic drugs will help you control your blood sugar. The right portions of healthy foods may help keep your sugar within your goal range. It may also help to lower or maintain your weight, manage cholesterol, the amount of fat in your blood, and blood pressure.      Image(s)    What will the results be?  Healthy eating may help you lower your blood sugar and keep it in a safe range. Keeping your blood sugar in a safe range may lower your chances for long term problems from your diabetes. You may be less likely to have damage to your kidneys, heart, eyes, or nerves.    What changes to diet are needed?  Healthy eating means:  Eating a range of foods from all food groups.  Eating the right size portions. Read the nutrition facts on each food you eat.  Eating meals and snacks at the same time each day. Do not skip a meal or snack. Skipping meals may cause your blood sugar to go too low, especially if you are on drugs for your diabetes. Skipping meals can also cause you to eat too much at the next meal.  Talk to your diabetes educator about making a personal meal plan for you. They can also help you eat the foods you like by modifying them to be healthier.    Who should use this diet?  This diet is helpful to people with high blood sugar or diabetes.    What foods are good to eat?  It is important to make a healthy meal. Eat a variety of different foods in the right portion.  Fill half of your plate with non-starchy vegetables. Non-starchy vegetables include: Broccoli, green beans, carrots, greens (gustavo, kale, mustard, turnip), onions, tomatoes,  asparagus, beets, cauliflower, celery, and cucumber. Non-starchy vegetables are high in fiber and low in carbohydrates. These will help keep you full for longer without raising your blood sugar.  Fill 1/4 of your plate with carbohydrates. Try to choose whole grain options. Whole-grain products have more fiber which will make you feel full. Carbohydrates include: Bread, rice, pasta, and starchy vegetables. Starchy vegetables include beans, potatoes, acorn squash, butternut squash, corn, and peas.  Fill 1/4 of your plate with protein. Choose low-fat cuts of meat like boneless, skinless chicken breast; pork loin; 90% lean beef; lean and skinless turkey meat; and fresh fish (not fried) such as tuna, salmon, or mackerel.  Add a low-fat or non-fat dairy product like 8 ounces (240 mL) of 1% or skim milk or 6 ounces (180 mL) of low-fat yogurt. Eat the recommended serving. Milk and yogurt have carbohydrates which raise your blood sugar.  Add a small piece of fruit or 1/2 cup (120 grams) of frozen or canned fruit. Choose canned fruits in juice or water. Fruits include apples, bananas, peaches, pears, pineapple, plums, and oranges. Eat the recommended serving. Fruit has carbohydrates which can raise your blood sugar.  Include healthy fats in your meal like olive oil, canola oil, avocado, and nuts.  Other good foods to include in your diet are:  Foods high in fiber. Adding fiber to your meals may help control your blood sugar and cholesterol levels. It may also help with weight loss and prevent belly problems. If you are younger than 50, it is recommended to get 25 to 38 grams per day. If you are older than 50, it is recommended to get 21 to 30 grams per day. Good sources of fiber include:  Nuts and seeds  Beans, peas, and other legumes  Whole-grain products  Fruits  Vegetables  Smart snacks in the right portion. Do not go too long in between meals. Ask your dietitian when you should have a snack in between your meals. Snack on  things like:  Nuts  Vegetables and low-fat dressing  Light popcorn  Low-fat cheese and crackers  1/2 sandwich    What foods should be limited or avoided?  You may need to limit the amount of some foods you eat and how often you eat them. Foods that should be limited include:  High fat or processed foods like:  [place]son  Sausage  Hot dogs  Whole-fat dairy products  Potato chips  Foods high in sodium like:  Canned soups  Soy sauce and some salad dressings  Cured meats  Salted snack foods like pretzels  Olives  Fats and oils like:  Margarine  Salad dressing  Gravy  Lard or shortening  Foods high in sugar like:  Candy  Cookies  Cake  Ice cream  Table sugar  Soda  Juice drinks  Starches that are not whole grain like:  White rice  French fries  White pasta  White bread  Sugary cereals  Baked goods, pastries, croissants  Beer, wine, and mixed drinks (alcohol). Drink alcohol in moderation (1 drink per day or less for adult women and 2 drinks per day or less for adult men). Drinking alcohol can cause fluctuations in your blood sugar and interfere with how your diabetic drugs work. Talk to your doctor about how you can safely include alcohol into your diet.    What can be done to prevent this health problem?  Some people have a higher chance of developing diabetes than others. This is a life-long problem. You can still lead a normal life. Diabetes can be managed through diet, exercise, and drugs. It is important to have support from your family and friends to help you with your goals.    When do I need to call the doctor?  Blood sugar level is above 240 mg/dL for more than a day  Blood sugar level drops to less than 40 and does not respond to 15 grams of simple carbohydrate, like 4 glucose tablets or 1/2 cup (120 mL) of fruit juice  Trouble breathing  Very sleepy and trouble concentrating  Feeling very thirsty  Needing to urinate (pee) more than normal  Throw up more than once or have many loose stools  You are so sick you  cannot eat or drink  Fever over 101°F (38°C)  Questions about your diet plan  You are not feeling better in 2 to 3 days or you are feeling worse    Helpful tips  Plan ahead. Plan your meals and grocery list before going to the store. This will help you to choose foods that are good for you.  Pack a lunch. Take healthy meals and snacks with you to work.  Keep a food journal to help keep you on track. Take note of foods that keep your blood sugar in goal range. Also note foods and meals that raise or lower your blood sugar. There are apps for your phone that can help with this.  Visit a restaurant's website before eating out. You can see the menu options and nutritional facts. This way, you can see which items best fit into your diet plan. Call ahead if you have questions.    Disclaimer.This generalized information is a limited summary of diagnosis, treatment, and/or medication information. It is not meant to be comprehensive and should be used as a tool to help the user understand and/or assess potential diagnostic and treatment options. It does NOT include all information about conditions, treatments, medications, side effects, or risks that may apply to a specific patient. It is not intended to be medical advice or a substitute for the medical advice, diagnosis, or treatment of a health care provider based on the health care provider's examination and assessment of a patient's specific and unique circumstances. Patients must speak with a health care provider for complete information about their health, medical questions, and treatment options, including any risks or benefits regarding use of medications. This information does not endorse any treatments or medications as safe, effective, or approved for treating a specific patient. [employer] and its affiliates disclaim any warranty or liability relating to this information or the use thereof. The use of this information is governed by the Terms of Use,  available at Terms of Use. ©2022 [employer] and its affiliates and/or licensors. All rights reserved. Avoid anything with sugar in the 1st 3 ingredients including honey and syrup.  Avoid dried fruits like raise nodes and apricots.  Other fruits and vegetables are okay but the sugar content is higher in bananas and grapes.  Avoid large portions on your starchy foods:  Bread rice potatoes and pasta.

## 2024-03-18 NOTE — PROGRESS NOTES
Subjective:       Patient ID: Abiodun Mora III is a 58 y.o. male.    Chief Complaint: Hyperlipidemia      Patient is here for follow-up on cholesterol.  He does report that the Cialis is too expensive to obtain and was advised to use a printed prescription.  He is also here for follow-up on cholesterol.  Lab Results       Component                Value               Date                       WBC                      3.85 (L)            10/29/2019                 HGB                      13.9 (L)            10/29/2019                 HCT                      42.4                10/29/2019                 PLT                      206                 10/29/2019                 CHOL                     214 (H)             09/15/2022                 TRIG                     63                  09/15/2022                 HDL                      59                  09/15/2022                 ALT                      29                  09/15/2022                 AST                      26                  09/15/2022                 NA                       140                 09/15/2022                 K                        5.1                 09/15/2022                 CL                       104                 09/15/2022                 CREATININE               1.2                 09/15/2022                 BUN                      13                  09/15/2022                 CO2                      30 (H)              09/15/2022                 TSH                      1.130               10/29/2019                 PSA                      0.94                09/15/2022                 HGBA1C                   6.0                 03/01/2023            Wt Readings from Last 4 Encounters:  03/18/24 : 86.6 kg (191 lb)  03/01/23 : 86.2 kg (190 lb)  01/17/23 : 85 kg (187 lb 8 oz)  09/15/22 : 85.6 kg (188 lb 12.8 oz)          Hyperlipidemia  This is a chronic problem. The problem is controlled. Recent lipid  tests were reviewed and are normal.       Allergies and Medications:   Review of patient's allergies indicates:  No Known Allergies  Current Outpatient Medications   Medication Sig Dispense Refill    atorvastatin (LIPITOR) 20 MG tablet TAKE 1 TABLET BY MOUTH EVERY DAY 90 tablet 1    fluticasone propionate (FLONASE) 50 mcg/actuation nasal spray 1 spray (50 mcg total) by Each Nostril route once daily. 48 mL 3    sildenafiL (VIAGRA) 50 MG tablet Take 1 tablet (50 mg total) by mouth daily as needed for Erectile Dysfunction. 9 tablet 5    tadalafiL (CIALIS) 20 MG Tab Take 1 tablet (20 mg total) by mouth once daily. 30 tablet 11     No current facility-administered medications for this visit.       Family History:   Family History   Problem Relation Age of Onset    Cancer Mother         colon    Cancer Father         prostate       Social History:   Social History     Socioeconomic History    Marital status:    Tobacco Use    Smoking status: Never    Smokeless tobacco: Never   Substance and Sexual Activity    Alcohol use: Yes    Drug use: No     Social Determinants of Health     Stress: No Stress Concern Present (10/29/2019)    [employer] [place] [employer] - Occupational Stress Questionnaire     Feeling of Stress : Not at all       Review of Systems    Objective:     Vitals:    03/18/24 1042   BP: 120/82   Pulse: 73   Resp: 18   Temp: 98.4 °F (36.9 °C)        Physical Exam  Vitals and nursing note reviewed.   Constitutional:       Appearance: He is well-developed. He is not diaphoretic.   HENT:      Head: Normocephalic.   Eyes:      Conjunctiva/sclera: Conjunctivae normal.      Pupils: Pupils are equal, round, and reactive to light.   Cardiovascular:      Rate and Rhythm: Normal rate and regular rhythm.      Heart sounds: Normal heart sounds. No murmur heard.     No friction rub. No gallop.   Pulmonary:      Effort: Pulmonary effort is normal. No respiratory distress.      Breath sounds:  Normal breath sounds. No stridor. No wheezing, rhonchi or rales.   Chest:      Chest wall: No tenderness.   Skin:     General: Skin is warm and dry.   Psychiatric:         Behavior: Behavior normal.         Thought Content: Thought content normal.         Judgment: Judgment normal.       Assessment:       1. Erectile dysfunction, unspecified erectile dysfunction type    2. Vasculogenic erectile dysfunction, unspecified vasculogenic erectile dysfunction type        Plan:       Abiodun was seen today for hyperlipidemia.    Diagnoses and all orders for this visit:    Erectile dysfunction, unspecified erectile dysfunction type  -     tadalafiL (CIALIS) 20 MG Tab; Take 1 tablet (20 mg total) by mouth once daily.    Vasculogenic erectile dysfunction, unspecified vasculogenic erectile dysfunction type         Follow up in about 6 months (around 9/18/2024).

## 2024-03-22 ENCOUNTER — LAB VISIT (OUTPATIENT)
Dept: LAB | Facility: HOSPITAL | Age: 59
End: 2024-03-22
Attending: FAMILY MEDICINE
Payer: COMMERCIAL

## 2024-03-22 DIAGNOSIS — E78.2 MIXED HYPERLIPIDEMIA: ICD-10-CM

## 2024-03-22 DIAGNOSIS — R73.01 IFG (IMPAIRED FASTING GLUCOSE): ICD-10-CM

## 2024-03-22 DIAGNOSIS — Z12.5 ENCOUNTER FOR PROSTATE CANCER SCREENING: ICD-10-CM

## 2024-03-22 LAB
ALBUMIN SERPL BCP-MCNC: 4.5 G/DL (ref 3.5–5.2)
ALP SERPL-CCNC: 58 U/L (ref 55–135)
ALT SERPL W/O P-5'-P-CCNC: 27 U/L (ref 10–44)
ANION GAP SERPL CALC-SCNC: 5 MMOL/L (ref 8–16)
AST SERPL-CCNC: 20 U/L (ref 10–40)
BILIRUB SERPL-MCNC: 0.7 MG/DL (ref 0.1–1)
BUN SERPL-MCNC: 14 MG/DL (ref 6–20)
CALCIUM SERPL-MCNC: 9.2 MG/DL (ref 8.7–10.5)
CHLORIDE SERPL-SCNC: 104 MMOL/L (ref 95–110)
CHOLEST SERPL-MCNC: 186 MG/DL (ref 120–199)
CHOLEST/HDLC SERPL: 3.4 {RATIO} (ref 2–5)
CO2 SERPL-SCNC: 30 MMOL/L (ref 23–29)
COMPLEXED PSA SERPL-MCNC: 1.12 NG/ML (ref 0–4)
CREAT SERPL-MCNC: 1 MG/DL (ref 0.5–1.4)
EST. GFR  (NO RACE VARIABLE): >60 ML/MIN/1.73 M^2
ESTIMATED AVG GLUCOSE: 137 MG/DL (ref 68–131)
GLUCOSE SERPL-MCNC: 98 MG/DL (ref 70–110)
HBA1C MFR BLD: 6.4 % (ref 4.5–6.2)
HDLC SERPL-MCNC: 54 MG/DL (ref 40–75)
HDLC SERPL: 29 % (ref 20–50)
LDLC SERPL CALC-MCNC: 118.4 MG/DL (ref 63–159)
NONHDLC SERPL-MCNC: 132 MG/DL
POTASSIUM SERPL-SCNC: 4.8 MMOL/L (ref 3.5–5.1)
PROT SERPL-MCNC: 7.8 G/DL (ref 6–8.4)
SODIUM SERPL-SCNC: 139 MMOL/L (ref 136–145)
TRIGL SERPL-MCNC: 68 MG/DL (ref 30–150)

## 2024-03-22 PROCEDURE — 84153 ASSAY OF PSA TOTAL: CPT | Performed by: FAMILY MEDICINE

## 2024-03-22 PROCEDURE — 80061 LIPID PANEL: CPT | Performed by: FAMILY MEDICINE

## 2024-03-22 PROCEDURE — 80053 COMPREHEN METABOLIC PANEL: CPT | Performed by: FAMILY MEDICINE

## 2024-03-22 PROCEDURE — 83036 HEMOGLOBIN GLYCOSYLATED A1C: CPT | Performed by: FAMILY MEDICINE

## 2024-03-22 PROCEDURE — 36415 COLL VENOUS BLD VENIPUNCTURE: CPT | Performed by: FAMILY MEDICINE

## 2024-03-27 ENCOUNTER — HOSPITAL ENCOUNTER (OUTPATIENT)
Dept: RADIOLOGY | Facility: HOSPITAL | Age: 59
Discharge: HOME | End: 2024-03-27
Attending: FAMILY MEDICINE
Payer: COMMERCIAL

## 2024-03-27 DIAGNOSIS — E78.2 MIXED HYPERLIPIDEMIA: ICD-10-CM

## 2024-03-27 DIAGNOSIS — R19.00 ABDOMINAL WALL MASS OF LEFT FLANK: ICD-10-CM

## 2024-03-27 PROCEDURE — 76700 US EXAM ABDOM COMPLETE: CPT | Mod: TC,PO

## 2024-03-27 RX ORDER — ATORVASTATIN CALCIUM 20 MG/1
20 TABLET, FILM COATED ORAL DAILY
Qty: 90 TABLET | Refills: 1 | Status: SHIPPED | OUTPATIENT
Start: 2024-03-27

## 2024-03-27 NOTE — PROGRESS NOTES
The ultrasound confirms a lipoma in the left flank as previously suspected.  Intervention is optional.

## 2024-03-28 ENCOUNTER — TELEPHONE (OUTPATIENT)
Dept: FAMILY MEDICINE | Facility: CLINIC | Age: 59
End: 2024-03-28
Payer: COMMERCIAL

## 2024-03-28 NOTE — TELEPHONE ENCOUNTER
----- Message from Saul Mendez MD sent at 3/27/2024 10:30 AM CDT -----  The ultrasound confirms a lipoma in the left flank as previously suspected.  Intervention is optional.

## 2024-08-01 ENCOUNTER — OCCUPATIONAL HEALTH (OUTPATIENT)
Dept: URGENT CARE | Facility: CLINIC | Age: 59
End: 2024-08-01

## 2024-08-30 ENCOUNTER — OFFICE VISIT (OUTPATIENT)
Dept: FAMILY MEDICINE | Facility: CLINIC | Age: 59
End: 2024-08-30
Payer: COMMERCIAL

## 2024-08-30 VITALS
BODY MASS INDEX: 27.29 KG/M2 | HEART RATE: 87 BPM | TEMPERATURE: 101 F | HEIGHT: 70 IN | DIASTOLIC BLOOD PRESSURE: 72 MMHG | SYSTOLIC BLOOD PRESSURE: 114 MMHG | WEIGHT: 190.63 LBS | OXYGEN SATURATION: 97 %

## 2024-08-30 DIAGNOSIS — E78.2 MIXED HYPERLIPIDEMIA: ICD-10-CM

## 2024-08-30 DIAGNOSIS — Z88.9 H/O SEASONAL ALLERGIES: ICD-10-CM

## 2024-08-30 DIAGNOSIS — J06.9 UPPER RESPIRATORY TRACT INFECTION, UNSPECIFIED TYPE: Primary | ICD-10-CM

## 2024-08-30 LAB
CTP QC/QA: YES
CTP QC/QA: YES
POC MOLECULAR INFLUENZA A AGN: NEGATIVE
POC MOLECULAR INFLUENZA B AGN: NEGATIVE
SARS-COV-2 RDRP RESP QL NAA+PROBE: POSITIVE

## 2024-08-30 PROCEDURE — 99999 PR PBB SHADOW E&M-EST. PATIENT-LVL III: CPT | Mod: PBBFAC,,, | Performed by: FAMILY MEDICINE

## 2024-08-30 RX ORDER — FLUTICASONE PROPIONATE 50 MCG
1 SPRAY, SUSPENSION (ML) NASAL DAILY
Qty: 48 ML | Refills: 3 | Status: SHIPPED | OUTPATIENT
Start: 2024-08-30

## 2024-08-30 RX ORDER — AZITHROMYCIN 250 MG/1
250 TABLET, FILM COATED ORAL DAILY
Qty: 6 TABLET | Refills: 0 | Status: SHIPPED | OUTPATIENT
Start: 2024-08-30 | End: 2024-09-05

## 2024-08-30 RX ORDER — PROMETHAZINE HYDROCHLORIDE AND DEXTROMETHORPHAN HYDROBROMIDE 6.25; 15 MG/5ML; MG/5ML
10 SYRUP ORAL NIGHTLY
Qty: 180 ML | Refills: 2 | Status: SHIPPED | OUTPATIENT
Start: 2024-08-30 | End: 2024-10-23

## 2024-08-30 RX ORDER — ATORVASTATIN CALCIUM 20 MG/1
20 TABLET, FILM COATED ORAL DAILY
Qty: 90 TABLET | Refills: 0 | Status: SHIPPED | OUTPATIENT
Start: 2024-08-30

## 2024-08-30 NOTE — PROGRESS NOTES
Subjective:       Patient ID: Abiodun Mora III is a 59 y.o. male.    Chief Complaint: URI (Chest/Sinus Congestion, Fever, Body-aches, Cough/Started yesterday)      Patient has been sick for several days with cough sinus congestion upper respiratory symptoms he is on the [employer] and did go to a meeting several days ago.  P.o. CT COVID test is positive today.  1          URI   This is a chronic problem. The problem has been gradually worsening. There has been no fever. Pertinent negatives include no abdominal pain, chest pain, congestion, conjunctivitis, coughing, diarrhea, dysuria, ear pain, headaches, joint pain, joint swelling, nausea, neck pain or plugged ear sensation.       Allergies and Medications:   Review of patient's allergies indicates:  No Known Allergies  Current Outpatient Medications   Medication Sig Dispense Refill    atorvastatin (LIPITOR) 20 MG tablet Take 1 tablet (20 mg total) by mouth once daily. 90 tablet 0    azithromycin (Z-RANDA) 250 MG tablet Take 1 tablet (250 mg total) by mouth once daily. po on day 1 then 1 tab po on days 2-5 for 6 doses 6 tablet 0    fluticasone propionate (FLONASE) 50 mcg/actuation nasal spray 1 spray (50 mcg total) by Each Nostril route once daily. 48 mL 3    promethazine-dextromethorphan (PROMETHAZINE-DM) 6.25-15 mg/5 mL Syrp Take 10 mLs by mouth every evening. 180 mL 2    sildenafiL (VIAGRA) 50 MG tablet Take 1 tablet (50 mg total) by mouth daily as needed for Erectile Dysfunction. (Patient not taking: Reported on 8/30/2024) 9 tablet 5    tadalafiL (CIALIS) 20 MG Tab Take 1 tablet (20 mg total) by mouth once daily. (Patient not taking: Reported on 8/30/2024) 30 tablet 11     No current facility-administered medications for this visit.       Family History:   Family History   Problem Relation Name Age of Onset    Cancer Mother          colon    Cancer Father          prostate       Social History:   Social History     Socioeconomic History    Marital  status:    Tobacco Use    Smoking status: Never    Smokeless tobacco: Never   Substance and Sexual Activity    Alcohol use: Yes    Drug use: No     Social Determinants of Health     Stress: No Stress Concern Present (10/29/2019)    [employer] [place] [employer] - Occupational Stress Questionnaire     Feeling of Stress : Not at all       Review of Systems   HENT:  Negative for congestion and ear pain.    Respiratory:  Negative for cough.    Cardiovascular:  Negative for chest pain.   Gastrointestinal:  Negative for abdominal pain, diarrhea and nausea.   Genitourinary:  Negative for dysuria.   Musculoskeletal:  Negative for joint pain and neck pain.   Neurological:  Negative for headaches.       Objective:     Vitals:    08/30/24 1452   BP: 114/72   Pulse: 87   Temp: (!) 100.5 °F (38.1 °C)        Physical Exam  Vitals and nursing note reviewed.   Constitutional:       General: He is not in acute distress.     Appearance: He is well-developed. He is not diaphoretic.   HENT:      Head: Normocephalic and atraumatic.      Right Ear: Hearing, tympanic membrane, ear canal and external ear normal. No decreased hearing noted. No drainage, swelling or tenderness. No middle ear effusion. No foreign body. No hemotympanum. Tympanic membrane is not injected, scarred, perforated, erythematous, retracted or bulging. Tympanic membrane has normal mobility.      Left Ear: Hearing, tympanic membrane, ear canal and external ear normal. No decreased hearing noted. No drainage, swelling or tenderness.  No middle ear effusion. No foreign body. No hemotympanum. Tympanic membrane is not injected, scarred, perforated, erythematous, retracted or bulging. Tympanic membrane has normal mobility.      Nose: Nose normal. No nasal deformity, septal deviation, laceration, mucosal edema or rhinorrhea.      Right Sinus: No maxillary sinus tenderness or frontal sinus tenderness.      Left Sinus: No maxillary sinus tenderness or frontal  sinus tenderness.      Mouth/Throat:      Dentition: Normal dentition.      Pharynx: Uvula midline. No oropharyngeal exudate or posterior oropharyngeal erythema.      Tonsils: No tonsillar abscesses.   Eyes:      General: No scleral icterus.        Right eye: No discharge.         Left eye: No discharge.      Conjunctiva/sclera: Conjunctivae normal.      Pupils: Pupils are equal, round, and reactive to light.   Neck:      Thyroid: No thyromegaly.   Cardiovascular:      Rate and Rhythm: Normal rate and regular rhythm.      Heart sounds: Normal heart sounds. No murmur heard.     No friction rub. No gallop.   Pulmonary:      Effort: Pulmonary effort is normal. No respiratory distress.      Breath sounds: Normal breath sounds. No stridor. No wheezing, rhonchi or rales.   Chest:      Chest wall: No tenderness.   Musculoskeletal:      Cervical back: Normal range of motion and neck supple.   Lymphadenopathy:      Cervical: No cervical adenopathy.         Assessment:       1. Upper respiratory tract infection, unspecified type    2. Mixed hyperlipidemia    3. H/O seasonal allergies        Plan:       Abiodun was seen today for uri.    Diagnoses and all orders for this visit:    Upper respiratory tract infection, unspecified type  -     promethazine-dextromethorphan (PROMETHAZINE-DM) 6.25-15 mg/5 mL Syrp; Take 10 mLs by mouth every evening.  -     azithromycin (Z-RANDA) 250 MG tablet; Take 1 tablet (250 mg total) by mouth once daily. po on day 1 then 1 tab po on days 2-5 for 6 doses    Mixed hyperlipidemia  -     atorvastatin (LIPITOR) 20 MG tablet; Take 1 tablet (20 mg total) by mouth once daily.  -     POCT Influenza A/B Molecular  -     POCT COVID-19 Rapid Screening    H/O seasonal allergies  -     fluticasone propionate (FLONASE) 50 mcg/actuation nasal spray; 1 spray (50 mcg total) by Each Nostril route once daily.         Follow up if symptoms worsen or fail to improve.

## 2024-09-14 ENCOUNTER — HOSPITAL ENCOUNTER (EMERGENCY)
Facility: HOSPITAL | Age: 59
Discharge: HOME | End: 2024-09-14
Attending: EMERGENCY MEDICINE
Payer: COMMERCIAL

## 2024-09-14 VITALS
DIASTOLIC BLOOD PRESSURE: 86 MMHG | OXYGEN SATURATION: 97 % | HEIGHT: 72 IN | RESPIRATION RATE: 18 BRPM | TEMPERATURE: 98 F | WEIGHT: 187 LBS | HEART RATE: 84 BPM | SYSTOLIC BLOOD PRESSURE: 130 MMHG | BODY MASS INDEX: 25.33 KG/M2

## 2024-09-14 DIAGNOSIS — S81.012A LACERATION OF LEFT KNEE, INITIAL ENCOUNTER: Primary | ICD-10-CM

## 2024-09-14 PROCEDURE — 25000003 PHARM REV CODE 250

## 2024-09-14 PROCEDURE — 12032 INTMD RPR S/A/T/EXT 2.6-7.5: CPT

## 2024-09-14 PROCEDURE — 99284 EMERGENCY DEPT VISIT MOD MDM: CPT | Mod: 25

## 2024-09-14 RX ORDER — BACITRACIN 500 [USP'U]/G
OINTMENT TOPICAL
Status: COMPLETED | OUTPATIENT
Start: 2024-09-14 | End: 2024-09-14

## 2024-09-14 RX ORDER — CEPHALEXIN 500 MG/1
1000 CAPSULE ORAL 2 TIMES DAILY
Qty: 20 CAPSULE | Refills: 0 | Status: SHIPPED | OUTPATIENT
Start: 2024-09-14 | End: 2024-09-19

## 2024-09-14 RX ORDER — HYDROCODONE BITARTRATE AND ACETAMINOPHEN 5; 325 MG/1; MG/1
1 TABLET ORAL EVERY 6 HOURS PRN
Qty: 12 TABLET | Refills: 0 | Status: SHIPPED | OUTPATIENT
Start: 2024-09-14 | End: 2024-09-17

## 2024-09-14 RX ORDER — LIDOCAINE HYDROCHLORIDE AND EPINEPHRINE 10; 10 MG/ML; UG/ML
10 INJECTION, SOLUTION INFILTRATION; PERINEURAL
Status: COMPLETED | OUTPATIENT
Start: 2024-09-14 | End: 2024-09-14

## 2024-09-14 RX ADMIN — LIDOCAINE HYDROCHLORIDE,EPINEPHRINE BITARTRATE 10 ML: 10; .01 INJECTION, SOLUTION INFILTRATION; PERINEURAL at 11:09

## 2024-09-14 RX ADMIN — BACITRACIN: 500 OINTMENT TOPICAL at 11:09

## 2024-09-14 NOTE — DISCHARGE INSTRUCTIONS
Stitches will need to be removed in 10 days.  Please keep the wound dry for 24 hours and then it can get wet but no submerging of the wound under water until the stitches removed.  You can apply antibacterial ointment to the wound twice a day.  Take the antibiotics as prescribed until gone.    Please return to the ED for redness swelling and warmth around the wound or the knee, fever, pus drainage from the wound, worsening pain, or any new or worsening concerns.

## 2024-09-14 NOTE — ED PROVIDER NOTES
Encounter Date: 9/14/2024       History     Chief Complaint   Patient presents with    Laceration     LEFT KNEE, WITH CHAINSAW     Patient is a 59 y.o. male with past medical history of hyperlipidemia who presents to ED via self for concern for knee laceration which began around 9:00 a.m..  Patient reports he was using a chain saw to cut branches when he accidentally bumped his left knee with a chain saw.  Patient was denies falls, head injury, or loss consciousness.  Patient denies other injuries.  Patient is awake and alert in no acute distress.  Patient's last tetanus was 2018.       Review of patient's allergies indicates:  No Known Allergies  Past Medical History:   Diagnosis Date    Allergy     Hyperlipidemia      Past Surgical History:   Procedure Laterality Date    COLONOSCOPY      2017    VASECTOMY  2000     Family History   Problem Relation Name Age of Onset    Cancer Mother          colon    Cancer Father          prostate     Social History     Tobacco Use    Smoking status: Never    Smokeless tobacco: Never   Substance Use Topics    Alcohol use: Yes    Drug use: No     Review of Systems   Constitutional: Negative.    HENT: Negative.     Respiratory: Negative.  Negative for shortness of breath.    Cardiovascular: Negative.  Negative for chest pain.   Gastrointestinal: Negative.    Musculoskeletal:  Negative for back pain, neck pain and neck stiffness.   Skin:  Positive for wound. Negative for color change, pallor and rash.   Neurological: Negative.  Negative for weakness.   Hematological:  Does not bruise/bleed easily.   Psychiatric/Behavioral: Negative.         Physical Exam     Initial Vitals [09/14/24 1019]   BP Pulse Resp Temp SpO2   (!) 142/89 85 18 98.6 °F (37 °C) 97 %      MAP       --         Physical Exam    Nursing note and vitals reviewed.  Constitutional: He appears well-developed and well-nourished. He is not diaphoretic. No distress.   HENT:   Head: Normocephalic and atraumatic.   Right  Ear: External ear normal.   Left Ear: External ear normal.   Eyes: EOM are normal.   Neck:   Normal range of motion.  Cardiovascular:  Normal rate, regular rhythm, normal heart sounds and intact distal pulses.     Exam reveals no gallop and no friction rub.       No murmur heard.  Pulmonary/Chest: Breath sounds normal. No respiratory distress. He has no wheezes. He has no rhonchi. He has no rales. He exhibits no tenderness.   Musculoskeletal:         General: Normal range of motion.      Cervical back: Normal range of motion.      Left hip: Normal.      Left upper leg: Normal.      Left knee: Swelling, laceration and bony tenderness present. No deformity, erythema, ecchymosis or crepitus. Normal range of motion. Tenderness present. Normal alignment.      Left lower leg: Normal.      Left ankle: Normal.      Left foot: Normal.     Neurological: He is alert and oriented to person, place, and time. He has normal strength. GCS score is 15. GCS eye subscore is 4. GCS verbal subscore is 5. GCS motor subscore is 6.   Skin: Skin is warm and dry. Capillary refill takes less than 2 seconds.   Psychiatric: He has a normal mood and affect. His behavior is normal. Judgment and thought content normal.         ED Course   Lac Repair    Date/Time: 9/14/2024 12:36 PM    Performed by: Starr Batista NP  Authorized by: Justin Prasad MD    Consent:     Consent obtained:  Verbal    Consent given by:  Patient    Risks, benefits, and alternatives were discussed: yes      Risks discussed:  Infection, pain, poor cosmetic result, poor wound healing, tendon damage and retained foreign body  Universal protocol:     Procedure explained and questions answered to patient or proxy's satisfaction: yes      Patient identity confirmed:  Verbally with patient  Anesthesia:     Anesthesia method:  Local infiltration    Local anesthetic:  Lidocaine 1% w/o epi  Laceration details:     Location:  Leg    Leg location:  L knee    Length (cm):   4  Pre-procedure details:     Preparation:  Patient was prepped and draped in usual sterile fashion and imaging obtained to evaluate for foreign bodies  Exploration:     Hemostasis achieved with:  Direct pressure and epinephrine    Imaging obtained: x-ray      Imaging outcome: foreign body not noted      Wound exploration: wound explored through full range of motion and entire depth of wound visualized      Wound extent: fascia violated      Wound extent: no foreign bodies/material noted, no muscle damage noted, no nerve damage noted, no tendon damage noted and no underlying fracture noted    Treatment:     Area cleansed with:  Povidone-iodine and saline    Amount of cleaning:  Standard    Irrigation solution:  Sterile saline    Irrigation method:  Syringe    Layers/structures repaired:  Deep dermal/superficial fascia  Deep dermal/superficial fascia:     Suture size:  4-0    Suture material:  Vicryl    Suture technique:  Simple interrupted    Number of sutures:  2  Skin repair:     Repair method:  Sutures    Suture size:  4-0    Suture material:  Nylon    Suture technique:  Horizontal mattress and simple interrupted    Number of sutures:  8  Approximation:     Approximation:  Close  Repair type:     Repair type:  Intermediate  Post-procedure details:     Dressing:  Antibiotic ointment    Procedure completion:  Tolerated well, no immediate complications    Labs Reviewed - No data to display       Imaging Results              X-Ray Knee 1 or 2 View Left (Final result)  Result time 09/14/24 10:49:51   Procedure changed from X-Ray Knee 3 View Left     Final result by Nelson Davis MD (09/14/24 10:49:51)                   Impression:      1. No osseous abnormalities or radiopaque soft tissue foreign bodies.      Electronically signed by: Nelson Davis  Date:    09/14/2024  Time:    10:49               Narrative:    EXAMINATION:  XR KNEE 1 OR 2 VIEW LEFT    CLINICAL HISTORY:  Laceration, chainsaw  injury    COMPARISON:  None.    FINDINGS:  AP and lateral views demonstrate no fracture, dislocation, or osseous destructive process.  No significant arthritic change is identified and there is no joint effusion.  Small soft tissue defect overlies the inferior patella.  No radiopaque foreign bodies are identified.                                       Medications   LIDOcaine-EPINEPHrine 1%-1:100,000 injection 10 mL (10 mLs Intradermal Given 9/14/24 1121)   bacitracin ointment ( Topical (Top) Given 9/14/24 1121)     Medical Decision Making  MDM    Patient presents for emergent evaluation of acute knee laceration that poses a possible threat to life and/or bodily function.    Differential diagnosis included but not limited to laceration, fracture fracture, tendon injury, joint space involvement.  In the ED patient found to have acute clear lung sounds bilaterally with no increased work of breathing.  There it was a 4 cm laceration to the left knee overlying the patella.  Patient has a normal strength of the left lower leg and able to bend and extend the knee without significant difficulty.  Patient was pulses, cap refill, and sensation distally.    X-ray negative for acute fracture or dislocation.  No radiopaque soft tissue seen on x-ray.    Patient was wound numbed and explored at length my attending at the bedside.  The laceration does not appear to communicate into the joint space and there appears to be no tendon or muscle involvement.  There is some superficial fascia involvement.    Discharge MDM  I discussed the patient presentation, X-rays, findings with ED attending Dr. Prasad.  Patient was managed in the ED with laceration repair.    The response to treatment was good.  Patient discharged with the crutches and oral antibiotics.  Patient was discharged in stable condition with close follow up.  Detailed return precautions discussed to return to the ED for any new or worsening concerns.  Patient verbalizes  understanding.    NP uses Epic and voice recognition software prone to occasional and minor errors that may persist in the medical record.      Amount and/or Complexity of Data Reviewed  Radiology: ordered and independent interpretation performed.    Risk  OTC drugs.  Prescription drug management.                                      Clinical Impression:  Final diagnoses:  [S81.012A] Laceration of left knee, initial encounter (Primary)          ED Disposition Condition    Discharge Stable          ED Prescriptions       Medication Sig Dispense Start Date End Date Auth. Provider    cephALEXin (KEFLEX) 500 MG capsule Take 2 capsules (1,000 mg total) by mouth 2 (two) times a day. for 5 days 20 capsule 9/14/2024 9/19/2024 Starr Batista NP    HYDROcodone-acetaminophen (NORCO) 5-325 mg per tablet Take 1 tablet by mouth every 6 (six) hours as needed for Pain. 12 tablet 9/14/2024 9/17/2024 Starr Batista NP          Follow-up Information       Follow up With Specialties Details Why Contact Info Additional Information    Saul Mendez MD Family Medicine Schedule an appointment as soon as possible for a visit  For recheck/continuing care, For suture removal [address]  [address]  [place] [address]  [phone]       [facility] - Emergency Dept Emergency Medicine  If symptoms worsen, For suture removal [address] [facility] [facility] [address]  [phone] 1st floor             Starr Batista NP  09/14/24 [id number]

## 2024-09-14 NOTE — Clinical Note
"Abiodun Beauchampzenobia Mora was seen and treated in our emergency department on 9/14/2024.  He may return to work on 09/17/2024.       If you have any questions or concerns, please don't hesitate to call.      Starr Batista NP"

## 2024-09-19 ENCOUNTER — TELEPHONE (OUTPATIENT)
Dept: FAMILY MEDICINE | Facility: CLINIC | Age: 59
End: 2024-09-19
Payer: COMMERCIAL

## 2024-09-25 ENCOUNTER — OFFICE VISIT (OUTPATIENT)
Dept: FAMILY MEDICINE | Facility: CLINIC | Age: 59
End: 2024-09-25
Payer: COMMERCIAL

## 2024-09-25 VITALS
BODY MASS INDEX: 25.33 KG/M2 | HEIGHT: 72 IN | RESPIRATION RATE: 18 BRPM | DIASTOLIC BLOOD PRESSURE: 80 MMHG | TEMPERATURE: 98 F | WEIGHT: 187 LBS | HEART RATE: 87 BPM | OXYGEN SATURATION: 97 % | SYSTOLIC BLOOD PRESSURE: 112 MMHG

## 2024-09-25 DIAGNOSIS — Z51.89 WOUND CHECK, ABSCESS: Primary | ICD-10-CM

## 2024-09-25 PROCEDURE — 3074F SYST BP LT 130 MM HG: CPT | Mod: CPTII,S$GLB,, | Performed by: FAMILY MEDICINE

## 2024-09-25 PROCEDURE — 15853 REMOVAL SUTR/STAPL XREQ ANES: CPT | Mod: S$GLB,,, | Performed by: FAMILY MEDICINE

## 2024-09-25 PROCEDURE — 3044F HG A1C LEVEL LT 7.0%: CPT | Mod: CPTII,S$GLB,, | Performed by: FAMILY MEDICINE

## 2024-09-25 PROCEDURE — 3079F DIAST BP 80-89 MM HG: CPT | Mod: CPTII,S$GLB,, | Performed by: FAMILY MEDICINE

## 2024-09-25 PROCEDURE — 1159F MED LIST DOCD IN RCRD: CPT | Mod: CPTII,S$GLB,, | Performed by: FAMILY MEDICINE

## 2024-09-25 PROCEDURE — 99999 PR PBB SHADOW E&M-EST. PATIENT-LVL III: CPT | Mod: PBBFAC,,, | Performed by: FAMILY MEDICINE

## 2024-09-25 PROCEDURE — 99213 OFFICE O/P EST LOW 20 MIN: CPT | Mod: S$GLB,,, | Performed by: FAMILY MEDICINE

## 2024-09-25 PROCEDURE — 3008F BODY MASS INDEX DOCD: CPT | Mod: CPTII,S$GLB,, | Performed by: FAMILY MEDICINE

## 2024-09-25 RX ORDER — AMOXICILLIN AND CLAVULANATE POTASSIUM 500; 125 MG/1; MG/1
1 TABLET, FILM COATED ORAL 2 TIMES DAILY
Qty: 20 TABLET | Refills: 0 | Status: SHIPPED | OUTPATIENT
Start: 2024-09-25 | End: 2024-10-05

## 2024-09-25 RX ORDER — HYDROCODONE BITARTRATE AND ACETAMINOPHEN 5; 325 MG/1; MG/1
1 TABLET ORAL EVERY 6 HOURS PRN
Qty: 40 TABLET | Refills: 0 | Status: SHIPPED | OUTPATIENT
Start: 2024-09-25 | End: 2024-10-09

## 2024-09-25 NOTE — PROCEDURES
Suture Removal    Date/Time: 9/25/2024 9:15 AM  Location procedure was performed: [facility] [address] [facility]    Performed by: Saul Mendez MD  Authorized by: Saul Mendez MD  Pre-operative diagnosis: laceration  Post-operative diagnosis: same  Wound Appearance: tender, draining and purulent  Sutures Removed: 8  Staples Removed: 0  Post-removal: Steri-Strips applied  Complications: No  Estimated blood loss (mL): 0  Specimens: No  Implants: No  Comments: Steri-Strips were placed for before an Ace wrap.

## 2024-09-25 NOTE — PROGRESS NOTES
Subjective:       Patient ID: Abiodun Mora III is a 59 y.o. male.    Chief Complaint: Suture / Staple Removal      Patient is here for follow-up on a laceration to the left knee this occurred while using a chain saw 2 weeks ago it was stitched primarily but is not completely healed but is starting to show some redness and inflammation.  He did take a 5 day course of Keflex which was completed.  Tetanus is up-to-date.    Suture / Staple Removal  The sutures were placed 7 to 10 days ago. His temperature was unmeasured prior to arrival.       Allergies and Medications:   Review of patient's allergies indicates:  No Known Allergies  Current Outpatient Medications   Medication Sig Dispense Refill    atorvastatin (LIPITOR) 20 MG tablet Take 1 tablet (20 mg total) by mouth once daily. 90 tablet 0    fluticasone propionate (FLONASE) 50 mcg/actuation nasal spray 1 spray (50 mcg total) by Each Nostril route once daily. 48 mL 3    promethazine-dextromethorphan (PROMETHAZINE-DM) 6.25-15 mg/5 mL Syrp Take 10 mLs by mouth every evening. 180 mL 2    sildenafiL (VIAGRA) 50 MG tablet Take 1 tablet (50 mg total) by mouth daily as needed for Erectile Dysfunction. (Patient not taking: Reported on 8/30/2024) 9 tablet 5    tadalafiL (CIALIS) 20 MG Tab Take 1 tablet (20 mg total) by mouth once daily. (Patient not taking: Reported on 8/30/2024) 30 tablet 11     No current facility-administered medications for this visit.       Family History:   Family History   Problem Relation Name Age of Onset    Cancer Mother          colon    Cancer Father          prostate       Social History:   Social History     Socioeconomic History    Marital status:    Tobacco Use    Smoking status: Never    Smokeless tobacco: Never   Substance and Sexual Activity    Alcohol use: Yes    Drug use: No     Social Determinants of Health     Stress: No Stress Concern Present (10/29/2019)    Citizen of [place] [employer] - Occupational Stress  Questionnaire     Feeling of Stress : Not at all       Review of Systems    Objective:     Vitals:    09/25/24 0933   BP: 112/80   Pulse: 87   Resp: 18   Temp: 98.3 °F (36.8 °C)        Physical Exam  Musculoskeletal:        Legs:       Comments: Sutures removed x8 the wound did not gape open but was reinforced with Steri-Strips and wrapped with a 4 x 4 and Ace wrap.         Assessment:       1. Wound check, abscess        Plan:       Abiodun was seen today for suture / staple removal.    Diagnoses and all orders for this visit:    Wound check, abscess  -     POCT Skin care  -     Suture Removal         Follow up in about 1 week (around 10/2/2024), or if symptoms worsen or fail to improve.

## 2024-10-03 ENCOUNTER — OFFICE VISIT (OUTPATIENT)
Dept: FAMILY MEDICINE | Facility: CLINIC | Age: 59
End: 2024-10-03
Payer: COMMERCIAL

## 2024-10-03 VITALS
TEMPERATURE: 99 F | HEIGHT: 72 IN | RESPIRATION RATE: 18 BRPM | SYSTOLIC BLOOD PRESSURE: 102 MMHG | OXYGEN SATURATION: 98 % | BODY MASS INDEX: 25.19 KG/M2 | DIASTOLIC BLOOD PRESSURE: 86 MMHG | WEIGHT: 186 LBS | HEART RATE: 74 BPM

## 2024-10-03 DIAGNOSIS — U07.1 BRONCHITIS DUE TO COVID-19 VIRUS: Primary | ICD-10-CM

## 2024-10-03 DIAGNOSIS — J40 BRONCHITIS DUE TO COVID-19 VIRUS: Primary | ICD-10-CM

## 2024-10-03 PROCEDURE — 3008F BODY MASS INDEX DOCD: CPT | Mod: CPTII,S$GLB,, | Performed by: FAMILY MEDICINE

## 2024-10-03 PROCEDURE — 99999 PR PBB SHADOW E&M-EST. PATIENT-LVL IV: CPT | Mod: PBBFAC,,, | Performed by: FAMILY MEDICINE

## 2024-10-03 PROCEDURE — 1159F MED LIST DOCD IN RCRD: CPT | Mod: CPTII,S$GLB,, | Performed by: FAMILY MEDICINE

## 2024-10-03 PROCEDURE — 3074F SYST BP LT 130 MM HG: CPT | Mod: CPTII,S$GLB,, | Performed by: FAMILY MEDICINE

## 2024-10-03 PROCEDURE — 3079F DIAST BP 80-89 MM HG: CPT | Mod: CPTII,S$GLB,, | Performed by: FAMILY MEDICINE

## 2024-10-03 PROCEDURE — 99213 OFFICE O/P EST LOW 20 MIN: CPT | Mod: S$GLB,,, | Performed by: FAMILY MEDICINE

## 2024-10-03 PROCEDURE — 3044F HG A1C LEVEL LT 7.0%: CPT | Mod: CPTII,S$GLB,, | Performed by: FAMILY MEDICINE

## 2024-10-03 RX ORDER — PROMETHAZINE HYDROCHLORIDE AND DEXTROMETHORPHAN HYDROBROMIDE 6.25; 15 MG/5ML; MG/5ML
5 SYRUP ORAL EVERY 4 HOURS PRN
Qty: 180 ML | Refills: 2 | Status: SHIPPED | OUTPATIENT
Start: 2024-10-03 | End: 2024-10-13

## 2024-10-03 NOTE — PROGRESS NOTES
Subjective:       Patient ID: Abiodun Mora III is a 59 y.o. male.    Chief Complaint: Hyperlipidemia and Wound Check      Patient is here as a follow-up on knee laceration sutures removed a week ago after being in for over 10 days.  He does have some residual left over cough from having COVID weeks ago.    Hyperlipidemia    Wound Check        Allergies and Medications:   Review of patient's allergies indicates:  No Known Allergies  Current Outpatient Medications   Medication Sig Dispense Refill    amoxicillin-clavulanate 500-125mg (AUGMENTIN) 500-125 mg Tab Take 1 tablet (500 mg total) by mouth 2 (two) times daily. for 10 days 20 tablet 0    atorvastatin (LIPITOR) 20 MG tablet Take 1 tablet (20 mg total) by mouth once daily. 90 tablet 0    fluticasone propionate (FLONASE) 50 mcg/actuation nasal spray 1 spray (50 mcg total) by Each Nostril route once daily. 48 mL 3    HYDROcodone-acetaminophen (NORCO) 5-325 mg per tablet Take 1 tablet by mouth every 6 (six) hours as needed for Pain. 40 tablet 0    promethazine-dextromethorphan (PROMETHAZINE-DM) 6.25-15 mg/5 mL Syrp Take 10 mLs by mouth every evening. 180 mL 2    promethazine-dextromethorphan (PROMETHAZINE-DM) 6.25-15 mg/5 mL Syrp Take 5 mLs by mouth every 4 (four) hours as needed (cough). 180 mL 2    sildenafiL (VIAGRA) 50 MG tablet Take 1 tablet (50 mg total) by mouth daily as needed for Erectile Dysfunction. (Patient not taking: Reported on 8/30/2024) 9 tablet 5    tadalafiL (CIALIS) 20 MG Tab Take 1 tablet (20 mg total) by mouth once daily. (Patient not taking: Reported on 10/3/2024) 30 tablet 11     No current facility-administered medications for this visit.       Family History:   Family History   Problem Relation Name Age of Onset    Cancer Mother          colon    Cancer Father          prostate       Social History:   Social History     Socioeconomic History    Marital status:    Tobacco Use    Smoking status: Never    Smokeless tobacco: Never    Substance and Sexual Activity    Alcohol use: Yes    Drug use: No     Social Drivers of Health     Stress: No Stress Concern Present (10/29/2019)    [place] [employer] - Occupational Stress Questionnaire     Feeling of Stress : Not at all       Review of Systems    Objective:     Vitals:    10/03/24 1321   BP: 102/86   Pulse: 74   Resp: 18   Temp: 98.6 °F (37 °C)        Physical Exam  Vitals and nursing note reviewed.   Constitutional:       General: He is not in acute distress.     Appearance: Normal appearance. He is well-developed and normal weight. He is not ill-appearing, toxic-appearing or diaphoretic.   HENT:      Head: Normocephalic and atraumatic.      Right Ear: Hearing, tympanic membrane, ear canal and external ear normal. No decreased hearing noted. No drainage, swelling or tenderness. No middle ear effusion. No foreign body. No hemotympanum. Tympanic membrane is not injected, scarred, perforated, erythematous, retracted or bulging. Tympanic membrane has normal mobility.      Left Ear: Hearing, tympanic membrane, ear canal and external ear normal. No decreased hearing noted. No drainage, swelling or tenderness.  No middle ear effusion. No foreign body. No hemotympanum. Tympanic membrane is not injected, scarred, perforated, erythematous, retracted or bulging. Tympanic membrane has normal mobility.      Nose: Nose normal. No nasal deformity, septal deviation, laceration, mucosal edema or rhinorrhea.      Right Sinus: No maxillary sinus tenderness or frontal sinus tenderness.      Left Sinus: No maxillary sinus tenderness or frontal sinus tenderness.      Mouth/Throat:      Dentition: Normal dentition.      Pharynx: Uvula midline. No oropharyngeal exudate or posterior oropharyngeal erythema.      Tonsils: No tonsillar abscesses.   Eyes:      General: No scleral icterus.        Right eye: No discharge.         Left eye: No discharge.      Conjunctiva/sclera: Conjunctivae normal.       Pupils: Pupils are equal, round, and reactive to light.   Neck:      Thyroid: No thyromegaly.   Cardiovascular:      Rate and Rhythm: Normal rate and regular rhythm.      Heart sounds: Normal heart sounds. No murmur heard.     No friction rub. No gallop.   Pulmonary:      Effort: Pulmonary effort is normal. No respiratory distress.      Breath sounds: Normal breath sounds. No stridor. No wheezing, rhonchi or rales.   Chest:      Chest wall: No tenderness.   Musculoskeletal:      Cervical back: Normal range of motion and neck supple.      Comments: Laceration on the left knee is healing in well areas of a little a little residual eschar but no active drainage.   Lymphadenopathy:      Cervical: No cervical adenopathy.   Neurological:      Mental Status: He is alert.         Assessment:       1. Bronchitis due to COVID-19 virus        Plan:       Abiodun was seen today for hyperlipidemia and wound check.    Diagnoses and all orders for this visit:    Bronchitis due to COVID-19 virus  -     promethazine-dextromethorphan (PROMETHAZINE-DM) 6.25-15 mg/5 mL Syrp; Take 5 mLs by mouth every 4 (four) hours as needed (cough).  -     X-Ray Chest PA And Lateral; Future         Follow up in about 6 months (around 4/3/2025) for follow up cholesterol.

## 2024-10-07 ENCOUNTER — HOSPITAL ENCOUNTER (OUTPATIENT)
Dept: RADIOLOGY | Facility: HOSPITAL | Age: 59
Discharge: HOME | End: 2024-10-07
Attending: FAMILY MEDICINE
Payer: COMMERCIAL

## 2024-10-07 DIAGNOSIS — J40 BRONCHITIS DUE TO COVID-19 VIRUS: ICD-10-CM

## 2024-10-07 DIAGNOSIS — U07.1 BRONCHITIS DUE TO COVID-19 VIRUS: ICD-10-CM

## 2024-10-07 PROCEDURE — 71046 X-RAY EXAM CHEST 2 VIEWS: CPT | Mod: 26,,, | Performed by: RADIOLOGY

## 2024-10-07 PROCEDURE — 71046 X-RAY EXAM CHEST 2 VIEWS: CPT | Mod: TC,PO

## 2024-10-09 ENCOUNTER — TELEPHONE (OUTPATIENT)
Dept: FAMILY MEDICINE | Facility: CLINIC | Age: 59
End: 2024-10-09
Payer: COMMERCIAL

## 2024-10-09 NOTE — TELEPHONE ENCOUNTER
Sent mssg on teams, no one said they came across his card. Called pt and advised      ----- Message from Med Assistant Tomlin sent at 10/9/2024 12:24 PM CDT -----  Please call pt he think he left his debit card 10/07 the name on card says [employer]     Pt#[phone]

## 2025-01-18 ENCOUNTER — HOSPITAL ENCOUNTER (EMERGENCY)
Facility: HOSPITAL | Age: 60
Discharge: HOME | End: 2025-01-18
Attending: STUDENT IN AN ORGANIZED HEALTH CARE EDUCATION/TRAINING PROGRAM
Payer: COMMERCIAL

## 2025-01-18 ENCOUNTER — OFFICE VISIT (OUTPATIENT)
Dept: URGENT CARE | Facility: CLINIC | Age: 60
End: 2025-01-18
Payer: COMMERCIAL

## 2025-01-18 VITALS
TEMPERATURE: 98 F | HEIGHT: 71 IN | WEIGHT: 187 LBS | BODY MASS INDEX: 26.18 KG/M2 | OXYGEN SATURATION: 98 % | SYSTOLIC BLOOD PRESSURE: 149 MMHG | HEART RATE: 57 BPM | DIASTOLIC BLOOD PRESSURE: 86 MMHG | RESPIRATION RATE: 18 BRPM

## 2025-01-18 VITALS
TEMPERATURE: 98 F | WEIGHT: 187 LBS | HEIGHT: 72 IN | SYSTOLIC BLOOD PRESSURE: 128 MMHG | RESPIRATION RATE: 20 BRPM | HEART RATE: 62 BPM | DIASTOLIC BLOOD PRESSURE: 87 MMHG | OXYGEN SATURATION: 98 % | BODY MASS INDEX: 25.33 KG/M2

## 2025-01-18 DIAGNOSIS — R42 VERTIGO: Primary | ICD-10-CM

## 2025-01-18 DIAGNOSIS — R42 DIZZINESS: Primary | ICD-10-CM

## 2025-01-18 DIAGNOSIS — R94.31 ABNORMAL EKG: ICD-10-CM

## 2025-01-18 DIAGNOSIS — R42 DIZZINESS: ICD-10-CM

## 2025-01-18 LAB
ALBUMIN SERPL BCP-MCNC: 4.2 G/DL (ref 3.5–5.2)
ALP SERPL-CCNC: 60 U/L (ref 40–150)
ALT SERPL W/O P-5'-P-CCNC: 41 U/L (ref 10–44)
ANION GAP SERPL CALC-SCNC: 9 MMOL/L (ref 8–16)
AST SERPL-CCNC: 23 U/L (ref 10–40)
BASOPHILS # BLD AUTO: 0.02 K/UL (ref 0–0.2)
BASOPHILS NFR BLD: 0.4 % (ref 0–1.9)
BILIRUB SERPL-MCNC: 0.6 MG/DL (ref 0.1–1)
BNP SERPL-MCNC: <10 PG/ML (ref 0–99)
BUN SERPL-MCNC: 11 MG/DL (ref 6–20)
CALCIUM SERPL-MCNC: 9.2 MG/DL (ref 8.7–10.5)
CHLORIDE SERPL-SCNC: 106 MMOL/L (ref 95–110)
CO2 SERPL-SCNC: 24 MMOL/L (ref 23–29)
CREAT SERPL-MCNC: 1.1 MG/DL (ref 0.5–1.4)
DIFFERENTIAL METHOD BLD: NORMAL
EOSINOPHIL # BLD AUTO: 0.1 K/UL (ref 0–0.5)
EOSINOPHIL NFR BLD: 1.9 % (ref 0–8)
ERYTHROCYTE [DISTWIDTH] IN BLOOD BY AUTOMATED COUNT: 13.4 % (ref 11.5–14.5)
EST. GFR  (NO RACE VARIABLE): >60 ML/MIN/1.73 M^2
GLUCOSE SERPL-MCNC: 102 MG/DL (ref 70–110)
GLUCOSE SERPL-MCNC: 87 MG/DL (ref 70–110)
HCT VFR BLD AUTO: 43.1 % (ref 40–54)
HGB BLD-MCNC: 14.2 G/DL (ref 14–18)
IMM GRANULOCYTES # BLD AUTO: 0.01 K/UL (ref 0–0.04)
IMM GRANULOCYTES NFR BLD AUTO: 0.2 % (ref 0–0.5)
INFLUENZA A, MOLECULAR: NEGATIVE
INFLUENZA B, MOLECULAR: NEGATIVE
LYMPHOCYTES # BLD AUTO: 1.3 K/UL (ref 1–4.8)
LYMPHOCYTES NFR BLD: 28.1 % (ref 18–48)
MCH RBC QN AUTO: 27.1 PG (ref 27–31)
MCHC RBC AUTO-ENTMCNC: 32.9 G/DL (ref 32–36)
MCV RBC AUTO: 82 FL (ref 82–98)
MONOCYTES # BLD AUTO: 0.3 K/UL (ref 0.3–1)
MONOCYTES NFR BLD: 7 % (ref 4–15)
NEUTROPHILS # BLD AUTO: 3 K/UL (ref 1.8–7.7)
NEUTROPHILS NFR BLD: 62.4 % (ref 38–73)
NRBC BLD-RTO: 0 /100 WBC
PLATELET # BLD AUTO: 231 K/UL (ref 150–450)
PMV BLD AUTO: 9.7 FL (ref 9.2–12.9)
POTASSIUM SERPL-SCNC: 4.4 MMOL/L (ref 3.5–5.1)
PROT SERPL-MCNC: 8 G/DL (ref 6–8.4)
RBC # BLD AUTO: 5.24 M/UL (ref 4.6–6.2)
SARS-COV-2 RDRP RESP QL NAA+PROBE: NEGATIVE
SODIUM SERPL-SCNC: 139 MMOL/L (ref 136–145)
SPECIMEN SOURCE: NORMAL
TROPONIN I SERPL DL<=0.01 NG/ML-MCNC: <0.006 NG/ML (ref 0–0.03)
TROPONIN I SERPL DL<=0.01 NG/ML-MCNC: <0.006 NG/ML (ref 0–0.03)
WBC # BLD AUTO: 4.73 K/UL (ref 3.9–12.7)

## 2025-01-18 PROCEDURE — 36415 COLL VENOUS BLD VENIPUNCTURE: CPT | Performed by: STUDENT IN AN ORGANIZED HEALTH CARE EDUCATION/TRAINING PROGRAM

## 2025-01-18 PROCEDURE — 87635 SARS-COV-2 COVID-19 AMP PRB: CPT | Performed by: STUDENT IN AN ORGANIZED HEALTH CARE EDUCATION/TRAINING PROGRAM

## 2025-01-18 PROCEDURE — 99214 OFFICE O/P EST MOD 30 MIN: CPT | Mod: S$GLB,,, | Performed by: NURSE PRACTITIONER

## 2025-01-18 PROCEDURE — 80053 COMPREHEN METABOLIC PANEL: CPT | Performed by: STUDENT IN AN ORGANIZED HEALTH CARE EDUCATION/TRAINING PROGRAM

## 2025-01-18 PROCEDURE — 63600175 PHARM REV CODE 636 W HCPCS: Performed by: STUDENT IN AN ORGANIZED HEALTH CARE EDUCATION/TRAINING PROGRAM

## 2025-01-18 PROCEDURE — 87502 INFLUENZA DNA AMP PROBE: CPT | Performed by: STUDENT IN AN ORGANIZED HEALTH CARE EDUCATION/TRAINING PROGRAM

## 2025-01-18 PROCEDURE — 83880 ASSAY OF NATRIURETIC PEPTIDE: CPT | Performed by: STUDENT IN AN ORGANIZED HEALTH CARE EDUCATION/TRAINING PROGRAM

## 2025-01-18 PROCEDURE — 82962 GLUCOSE BLOOD TEST: CPT | Mod: ,,, | Performed by: NURSE PRACTITIONER

## 2025-01-18 PROCEDURE — 93005 ELECTROCARDIOGRAM TRACING: CPT

## 2025-01-18 PROCEDURE — 25000003 PHARM REV CODE 250: Performed by: STUDENT IN AN ORGANIZED HEALTH CARE EDUCATION/TRAINING PROGRAM

## 2025-01-18 PROCEDURE — 85025 COMPLETE CBC W/AUTO DIFF WBC: CPT | Performed by: STUDENT IN AN ORGANIZED HEALTH CARE EDUCATION/TRAINING PROGRAM

## 2025-01-18 PROCEDURE — 93010 ELECTROCARDIOGRAM REPORT: CPT | Mod: 76,,, | Performed by: INTERNAL MEDICINE

## 2025-01-18 PROCEDURE — 93000 ELECTROCARDIOGRAM COMPLETE: CPT | Mod: S$GLB,,, | Performed by: NURSE PRACTITIONER

## 2025-01-18 PROCEDURE — 99285 EMERGENCY DEPT VISIT HI MDM: CPT | Mod: 25

## 2025-01-18 PROCEDURE — 84484 ASSAY OF TROPONIN QUANT: CPT | Mod: 91 | Performed by: STUDENT IN AN ORGANIZED HEALTH CARE EDUCATION/TRAINING PROGRAM

## 2025-01-18 PROCEDURE — 96374 THER/PROPH/DIAG INJ IV PUSH: CPT

## 2025-01-18 RX ORDER — MECLIZINE HYDROCHLORIDE 25 MG/1
25 TABLET ORAL 3 TIMES DAILY PRN
Qty: 20 TABLET | Refills: 0 | Status: SHIPPED | OUTPATIENT
Start: 2025-01-18

## 2025-01-18 RX ORDER — MECLIZINE HYDROCHLORIDE 25 MG/1
25 TABLET ORAL
Status: COMPLETED | OUTPATIENT
Start: 2025-01-18 | End: 2025-01-18

## 2025-01-18 RX ORDER — ONDANSETRON HYDROCHLORIDE 2 MG/ML
4 INJECTION, SOLUTION INTRAVENOUS
Status: COMPLETED | OUTPATIENT
Start: 2025-01-18 | End: 2025-01-18

## 2025-01-18 RX ADMIN — SODIUM CHLORIDE 1000 ML: 9 INJECTION, SOLUTION INTRAVENOUS at 01:01

## 2025-01-18 RX ADMIN — ONDANSETRON 4 MG: 2 INJECTION INTRAMUSCULAR; INTRAVENOUS at 01:01

## 2025-01-18 RX ADMIN — MECLIZINE HYDROCHLORIDE 25 MG: 25 TABLET ORAL at 01:01

## 2025-01-18 NOTE — DISCHARGE INSTRUCTIONS
Please return to the emergency department if you have new or worsening symptoms.  Follow up with Cardiology for further evaluation of your abnormal EKG.  He had been prescribed meclizine in the event your symptoms come back.

## 2025-01-18 NOTE — ED PROVIDER NOTES
"Encounter Date: 1/18/2025       History     Chief Complaint   Patient presents with    Dizziness     Woke up at 0800 " room spinning "     HPI    Abiodun Mora III is a 59 y.o. male with a past medical history of hyperlipidemia that presents emergency department for evaluation of dizziness.  Started at 8:00 a.m. this morning when he woke up.  He felt the room spinning for 2-3 minutes and symptoms resolved.  He then began to feel like he was off and could not put his finger on exactly how he was feeling unusual.  He was never complaining of any chest pain or shortness of breath.  No numbness or weakness.  Endorses good p.o. intake.  Not having any vomiting.  He was seen at urgent care earlier today and they performed an EKG which they told him looked unremarkable.  He had a normal neuro exam over there per documentation.  He stated that when he laid flat, his symptoms got worse.  He has been able to ambulate.  Sent to emergency department for further evaluation of dizziness.  At this time, patient is not complaining of any vertigo like symptoms but is only complaining of feeling off.  No chest pain, shortness of breath, fevers, recent illness, URI symptoms, numbness, vision changes, or weakness.    Review of patient's allergies indicates:  No Known Allergies  Past Medical History:   Diagnosis Date    Allergy     Hyperlipidemia      Past Surgical History:   Procedure Laterality Date    COLONOSCOPY      2017    VASECTOMY  2000     Family History   Problem Relation Name Age of Onset    Cancer Mother          colon    Cancer Father          prostate     Social History     Tobacco Use    Smoking status: Never    Smokeless tobacco: Never   Substance Use Topics    Alcohol use: Yes    Drug use: No     Review of Systems   Constitutional:  Negative for fever.   HENT:  Negative for sore throat.    Eyes:  Negative for visual disturbance.   Respiratory:  Negative for shortness of breath.    Cardiovascular:  Negative for " chest pain.   Gastrointestinal:  Negative for nausea and vomiting.   Genitourinary:  Negative for dysuria.   Musculoskeletal:  Negative for back pain.   Skin:  Negative for rash.   Neurological:  Positive for dizziness. Negative for weakness, numbness and headaches.   Hematological:  Does not bruise/bleed easily.       Physical Exam     Initial Vitals [01/18/25 1154]   BP Pulse Resp Temp SpO2   134/79 68 18 97.4 °F (36.3 °C) 98 %      MAP       --         Physical Exam    Nursing note and vitals reviewed.  Constitutional: He appears well-developed and well-nourished.   HENT:   Head: Normocephalic and atraumatic.   TMs are clear bilaterally without evidence of effusion.   Eyes: EOM are normal. Pupils are equal, round, and reactive to light.   Neck:   Normal range of motion.  Cardiovascular:  Normal rate, regular rhythm and normal heart sounds.           Pulmonary/Chest: Breath sounds normal. No respiratory distress.   Abdominal: Abdomen is soft. He exhibits no distension.   Musculoskeletal:         General: Normal range of motion.      Cervical back: Normal range of motion.     Neurological: He is alert and oriented to person, place, and time. He has normal strength. No cranial nerve deficit or sensory deficit. GCS score is 15. GCS eye subscore is 4. GCS verbal subscore is 5. GCS motor subscore is 6.   Strength sensation are intact throughout.  No cranial nerve deficits.  No nystagmus noted.  Ambulatory with a steady gait.  Normal finger-nose-finger.  Normal heel-to-shin.  No visual field deficits seen on confrontation.   Skin: Capillary refill takes less than 2 seconds.   Psychiatric: He has a normal mood and affect.         ED Course   Procedures  Labs Reviewed   INFLUENZA A & B BY MOLECULAR       Result Value    Influenza A, Molecular Negative      Influenza B, Molecular Negative      Flu A & B Source Nasal swab     CBC W/ AUTO DIFFERENTIAL    WBC 4.73      RBC 5.24      Hemoglobin 14.2      Hematocrit 43.1       MCV 82      MCH 27.1      MCHC 32.9      RDW 13.4      Platelets 231      MPV 9.7      Immature Granulocytes 0.2      Gran # (ANC) 3.0      Immature Grans (Abs) 0.01      Lymph # 1.3      Mono # 0.3      Eos # 0.1      Baso # 0.02      nRBC 0      Gran % 62.4      Lymph % 28.1      Mono % 7.0      Eosinophil % 1.9      Basophil % 0.4      Differential Method Automated     COMPREHENSIVE METABOLIC PANEL    Sodium 139      Potassium 4.4      Chloride 106      CO2 24      Glucose 102      BUN 11      Creatinine 1.1      Calcium 9.2      Total Protein 8.0      Albumin 4.2      Total Bilirubin 0.6      Alkaline Phosphatase 60      AST 23      ALT 41      eGFR >60      Anion Gap 9     TROPONIN I    Troponin I <0.006     B-TYPE NATRIURETIC PEPTIDE    BNP <10     SARS-COV-2 RNA AMPLIFICATION, QUAL    SARS-CoV-2 RNA, Amplification, Qual Negative     TROPONIN I    Troponin I <0.006       EKG Readings: (Independently Interpreted)   Rhythm: Normal Sinus Rhythm. Ectopy: No Ectopy. Conduction: Normal. ST Segments: Normal ST Segments. T Waves Elevated: III and AVF. Clinical Impression: Normal Sinus Rhythm   Other EKG Interpretations: Repeat EKG is unchanged from initial.  Normal sinus rhythm with T-wave inversions in lead 3 and AVF.       Imaging Results              X-Ray Chest AP Portable (Final result)  Result time 01/18/25 12:31:01      Final result by Tessy Bautista MD (01/18/25 12:31:01)                   Impression:      No acute abnormality.      Electronically signed by: Tessy Bautista MD  Date:    01/18/2025  Time:    12:31               Narrative:    EXAMINATION:  XR CHEST AP PORTABLE    CLINICAL HISTORY:  Dizziness;    TECHNIQUE:  Single frontal view of the chest was performed.    COMPARISON:  10/07/2024    FINDINGS:  The lungs are clear, with normal appearance of pulmonary vasculature and no pleural effusion or pneumothorax.    The cardiac silhouette is normal in size. The hilar and mediastinal contours are  unremarkable.    Bones are intact.                                    X-Rays:   Independently Interpreted Readings:   Other Readings:  No acute cardiopulmonary abnormality.    Medications   sodium chloride 0.9% bolus 1,000 mL 1,000 mL (1,000 mLs Intravenous New Bag 1/18/25 1325)   meclizine tablet 25 mg (25 mg Oral Given 1/18/25 1328)   ondansetron injection 4 mg (4 mg Intravenous Given 1/18/25 1325)     Medical Decision Making  59-year-old male with a past medical history of hyperlipidemia that presents emergency department for evaluation of vertigo like symptoms.  Vitals were stable.  Nontoxic male.  Neuro exam as described above.  Differential includes but is not limited to ACS, electrolyte abnormality, dehydration, viral syndrome, heart failure, pneumonia, pneumothorax, symptomatic pleural effusion, and symptomatic anemia.  Given that patient is ambulatory with a steady gait, has no nystagmus, has a normal neuro exam, and his symptoms have largely resolved, I doubt that he has a CVA that is the etiology of his vertigo.  EKG shows normal sinus rhythm with T-wave inversions in lead 3 and AVF.  No old EKG to compare to.  Repeat EKG is unchanged.  Troponin x2 is negative.  BNP within normal limits.  CBC is unremarkable.  CMP without evidence of significant electrolyte abnormality.  Viral swabs are negative.  Given fluids.  At this time, patient is feeling well and back to baseline.  Referred to cardiology.  Discharging home at this time.      Amount and/or Complexity of Data Reviewed  Labs: ordered.  Radiology: ordered.    Risk  Prescription drug management.                                      Clinical Impression:  Final diagnoses:  [R42] Dizziness  [R42] Vertigo (Primary)  [R94.31] Abnormal EKG          ED Disposition Condition    Discharge Stable          ED Prescriptions    None       Follow-up Information       Follow up With Specialties Details Why Contact Info Additional Information    [place] [facility]  ED Emergency Medicine  As needed, If symptoms worsen [address] [place] [address] [address] [place] [id number] 1st floor    Mick Patel MD Cardiology Call in 2 days Follow up on ED visit [address] [facility]  [address]  [place] [address]  [phone]                Santos Ku MD  01/18/25 2725

## 2025-01-18 NOTE — PROGRESS NOTES
"Subjective:      Patient ID: Abiodun Mora III is a 59 y.o. male.    Vitals:  height is 5' 11.5" (1.816 m) and weight is 84.8 kg (187 lb). His oral temperature is 98 °F (36.7 °C). His blood pressure is 128/87 and his pulse is 62. His respiration is 20 and oxygen saturation is 98%.     Chief Complaint: Nausea    Abiodun Mora is a 59 year old male with pmh hyperlipidemia presenting to the clinic with c/o dizziness and nausea. He states he woke up and felt as if the room was spinning. He denies chest pain, SOB, weakness, numbness, speech/vision/gait changes. He reports dizziness was worse with lying down but is resolved. States he still feels abnormal.     Nausea  This is a new problem. The current episode started today. The problem occurs intermittently. Associated symptoms include congestion and nausea. Pertinent negatives include no headaches, numbness or vertigo. Exacerbated by: laying down. The treatment provided no relief.       Constitution: Negative.   HENT:  Positive for congestion.    Respiratory: Negative.     Gastrointestinal:  Positive for nausea.   Genitourinary: Negative.    Musculoskeletal: Negative.    Neurological:  Positive for dizziness. Negative for history of vertigo, speech difficulty, loss of balance, headaches, numbness and tingling.      Objective:     Physical Exam   Constitutional: He is oriented to person, place, and time. He appears well-developed. He is cooperative.  Non-toxic appearance. He does not appear ill. No distress.   HENT:   Head: Normocephalic and atraumatic.   Ears:   Right Ear: Hearing, tympanic membrane, external ear and ear canal normal.   Left Ear: Hearing, tympanic membrane, external ear and ear canal normal.   Nose: Nose normal. No mucosal edema, rhinorrhea or nasal deformity. No epistaxis. Right sinus exhibits no maxillary sinus tenderness and no frontal sinus tenderness. Left sinus exhibits no maxillary sinus tenderness and no frontal sinus tenderness. "   Mouth/Throat: Uvula is midline, oropharynx is clear and moist and mucous membranes are normal. No trismus in the jaw. Normal dentition. No uvula swelling. No oropharyngeal exudate, posterior oropharyngeal edema or posterior oropharyngeal erythema.   Eyes: Conjunctivae and lids are normal. Pupils are equal, round, and reactive to light. No scleral icterus. Extraocular movement intact   Neck: Trachea normal and phonation normal. Neck supple. No edema present. No erythema present. No neck rigidity present.   Cardiovascular: Normal rate, regular rhythm, normal heart sounds and normal pulses.   Pulmonary/Chest: Effort normal and breath sounds normal. No respiratory distress. He has no decreased breath sounds. He has no rhonchi.   Abdominal: Normal appearance.   Musculoskeletal: Normal range of motion.         General: No deformity. Normal range of motion.   Neurological: He is alert and oriented to person, place, and time. He exhibits normal muscle tone. Gait normal. Coordination normal. GCS eye subscore is 4. GCS verbal subscore is 5. GCS motor subscore is 6.      Comments: Cranial nerves 3-12 grossly intact  5/5 strength  Normal gait   Skin: Skin is warm, dry, intact, not diaphoretic and not pale.   Psychiatric: His speech is normal and behavior is normal. Judgment and thought content normal.   Nursing note and vitals reviewed.      Assessment:     1. Dizziness        Plan:     The patient is presenting with dizziness and nausea. He has no chest pain, SOB but states he has never experienced these symptoms and felt unsteady this morning while walking.  He has a normal neuro exam at this time with normal gait and improvement of dizziness.  EKG reviewed with interpretation as follows: normal sinus rhythm, no ST elevation  Glucose: 86     Recommended ED for further evaluation to r/o emergent causes of dizziness. Patient's wife will transport. EMS offered but patient declined.   Dizziness  -     POCT Glucose, Hand-Held  Device  -     EKG 12-lead; Future

## 2025-01-21 LAB
OHS QRS DURATION: 80 MS
OHS QRS DURATION: 84 MS
OHS QTC CALCULATION: 405 MS
OHS QTC CALCULATION: 419 MS

## 2025-03-09 DIAGNOSIS — E78.2 MIXED HYPERLIPIDEMIA: ICD-10-CM

## 2025-03-09 NOTE — TELEPHONE ENCOUNTER
Care Due:                  Date            Visit Type   Department     Provider  --------------------------------------------------------------------------------                                EP - [facility] [address]  Last Visit: 10-      [facility] ([facility]   [facility]                              EP - [facility] [address]  Next Visit: 04-      [facility] ([facility]  [place]                                                            Last  Test          Frequency    Reason                     Performed    Due Date  --------------------------------------------------------------------------------    Lipid Panel.  12 months..  atorvastatin.............  03- 03-    [facility] Embedded Care Due Messages. Reference number: [id number].   3/09/2025 6:58:40 AM CDT

## 2025-03-10 RX ORDER — ATORVASTATIN CALCIUM 20 MG/1
20 TABLET, FILM COATED ORAL
Qty: 90 TABLET | Refills: 1 | Status: SHIPPED | OUTPATIENT
Start: 2025-03-10

## 2025-04-03 ENCOUNTER — OFFICE VISIT (OUTPATIENT)
Dept: FAMILY MEDICINE | Facility: CLINIC | Age: 60
End: 2025-04-03
Payer: COMMERCIAL

## 2025-04-03 VITALS
BODY MASS INDEX: 27.84 KG/M2 | OXYGEN SATURATION: 96 % | HEIGHT: 71 IN | HEART RATE: 73 BPM | SYSTOLIC BLOOD PRESSURE: 120 MMHG | WEIGHT: 198.88 LBS | DIASTOLIC BLOOD PRESSURE: 78 MMHG | TEMPERATURE: 98 F | RESPIRATION RATE: 18 BRPM

## 2025-04-03 DIAGNOSIS — N52.9 ERECTILE DYSFUNCTION, UNSPECIFIED ERECTILE DYSFUNCTION TYPE: ICD-10-CM

## 2025-04-03 DIAGNOSIS — Z23 IMMUNIZATION DUE: ICD-10-CM

## 2025-04-03 DIAGNOSIS — E78.2 MIXED HYPERLIPIDEMIA: Primary | ICD-10-CM

## 2025-04-03 DIAGNOSIS — Z88.9 H/O SEASONAL ALLERGIES: ICD-10-CM

## 2025-04-03 DIAGNOSIS — Z12.5 ENCOUNTER FOR PROSTATE CANCER SCREENING: ICD-10-CM

## 2025-04-03 PROCEDURE — 99213 OFFICE O/P EST LOW 20 MIN: CPT | Mod: S$GLB,,, | Performed by: FAMILY MEDICINE

## 2025-04-03 PROCEDURE — 3074F SYST BP LT 130 MM HG: CPT | Mod: CPTII,S$GLB,, | Performed by: FAMILY MEDICINE

## 2025-04-03 PROCEDURE — 3078F DIAST BP <80 MM HG: CPT | Mod: CPTII,S$GLB,, | Performed by: FAMILY MEDICINE

## 2025-04-03 PROCEDURE — 99999 PR PBB SHADOW E&M-EST. PATIENT-LVL III: CPT | Mod: PBBFAC,,, | Performed by: FAMILY MEDICINE

## 2025-04-03 PROCEDURE — 1159F MED LIST DOCD IN RCRD: CPT | Mod: CPTII,S$GLB,, | Performed by: FAMILY MEDICINE

## 2025-04-03 PROCEDURE — 3008F BODY MASS INDEX DOCD: CPT | Mod: CPTII,S$GLB,, | Performed by: FAMILY MEDICINE

## 2025-04-03 RX ORDER — TADALAFIL 20 MG/1
20 TABLET ORAL DAILY
Qty: 30 TABLET | Refills: 11 | Status: SHIPPED | OUTPATIENT
Start: 2025-04-03 | End: 2026-04-03

## 2025-04-03 RX ORDER — FLUTICASONE PROPIONATE 50 MCG
1 SPRAY, SUSPENSION (ML) NASAL DAILY
Qty: 48 ML | Refills: 3 | Status: SHIPPED | OUTPATIENT
Start: 2025-04-03

## 2025-04-03 NOTE — PROGRESS NOTES
Patient dischrge from facility at present time. Patient mother and daughter at bedside. [facility] at bedside for transport. [facility] received patient discharge instructions for SNF. Patient discharged ith PICC to [facility]. Flush well. No sign of infection noted. Hemovac drain removed from pt left hip per Dr. Ramirez. Wound vac removed from patient buttocks and replaced with wet to dry dressing. Patient escorted from facility via stretcher. No concerns voiced.   Subjective:       Patient ID: Abiodun Mora III is a 59 y.o. male.    Chief Complaint: Follow-up (6 month chol) and Medication Refill    Lab Results   Component Value Date    WBC 4.73 01/18/2025    HGB 14.2 01/18/2025    HCT 43.1 01/18/2025     01/18/2025    CHOL 186 03/22/2024    TRIG 68 03/22/2024    HDL 54 03/22/2024    ALT 41 01/18/2025    AST 23 01/18/2025     01/18/2025    K 4.4 01/18/2025     01/18/2025    CREATININE 1.1 01/18/2025    BUN 11 01/18/2025    CO2 24 01/18/2025    TSH 1.130 10/29/2019    PSA 1.12 03/22/2024    HGBA1C 6.4 (H) 03/22/2024     Wt Readings from Last 4 Encounters:   04/03/25 90.2 kg (198 lb 13.7 oz)   01/18/25 84.8 kg (187 lb)   01/18/25 84.8 kg (187 lb)   10/03/24 84.4 kg (186 lb)           History of Present Illness    CHIEF COMPLAINT:  Mr. Mora presents for a follow-up visit regarding cholesterol management, medication refills for Cialis and Flonase, and to address recent allergy flare-ups.    HPI:  Mr. Mora reports ongoing management of seasonal allergies with daily use of Flonase nasal spray. He has been using the spray every day or every other day for 20 years, which has been effective in controlling his symptoms. Since following the doctor's advice to use it daily, he has seen significant improvement in his ability to engage in activities that previously triggered his allergies. He can now work around smoke and [place], although he tries to wear a mask for most of these activities. He notes that current allergens, such as pollen and legustrums, are particularly bothersome, but the nasal spray helps manage symptoms effectively.    His last cholesterol check was approximately a year ago, with results described as favorable by the doctor. He had some labs done in the ER in January, but this did not include a cholesterol panel. His most recent A1c was 6.4, which is in the pre-diabetes range and just a tenth of a point from being considered  diabetic. This test was performed almost a year ago.    Regarding weight, there is some discrepancy in the reported measurements. His weight has been stable at around 186-187 lbs in previous visits, but today's measurement showed 198 lbs. He expresses surprise at this increase and suggests it might be an error, as he believed he saw 190 on the scale. He typically loses weight in the summer due to increased outdoor activities like cutting grass and weed eating.    He denies chest pain, trouble breathing, significant weight gain, kidney stones, or osteoporosis.    MEDICATIONS:  Mr. Mora is on Cialis for erectile dysfunction. He uses Flonase nasal spray, 2 sprays daily or every other day, which is effective for his allergies. He also takes a daily multivitamin.    MEDICAL HISTORY:  Mr. Mora has a history of pre-diabetes, with his last A1c being 6.4. He also has seasonal allergies. Mr. Mora has received the shingles vaccine.    TEST RESULTS:  Mr. Mora's cholesterol test from 1 year ago showed favorable results. His A1c test, also from 1 year ago, was 6.4, which falls in the pre-diabetes range. A PSA test was also conducted 1 year ago.      ROS:  Constitutional: +weight gain  Respiratory: -difficulty breathing, -shortness of breath  Cardiovascular: -chest pain  Allergic: +seasonal allergies          Allergies and Medications:   Review of patient's allergies indicates:  No Known Allergies  Current Medications[1]    Family History:   Family History   Problem Relation Name Age of Onset    Cancer Mother          colon    Cancer Father          prostate       Social History:   Social History[2]        Objective:     Vitals:    04/03/25 1256   BP: 120/78   Pulse: 73   Resp: 18   Temp: 97.8 °F (36.6 °C)        Physical Exam    Vitals: Pulse oximetry 96% on room air.  General: No acute distress. Well-developed. Well-nourished.  Eyes: EOMI. Sclerae anicteric.  HENT: Normocephalic. Atraumatic. Nares patent.  Moist oral mucosa.  Cardiovascular: Regular rate. Regular rhythm. No murmurs. No rubs. No gallops. Normal S1, S2.  Respiratory: Normal respiratory effort. Clear to auscultation bilaterally. No rales. No rhonchi. No wheezing.  Musculoskeletal: No  obvious deformity.  Extremities: No lower extremity edema.  Neurological: Alert & oriented x3. No slurred speech. Normal gait.  Psychiatric: Normal mood. Normal affect. Good insight. Good judgment.  Skin: Warm. Dry. No rash.  Neck: All normal.            Assessment:       1. Mixed hyperlipidemia    2. Erectile dysfunction, unspecified erectile dysfunction type    3. H/O seasonal allergies    4. Immunization due    5. Encounter for prostate cancer screening        Plan:       Assessment & Plan    R73.03 Prediabetes  E78.5 Hyperlipidemia, unspecified  J30.1 Allergic rhinitis due to pollen  N52.9 Male erectile dysfunction, unspecified    IMPRESSION:  - Assessed cholesterol management, noting last labs from a year ago showed good results.  - Evaluated prediabetes, with previous A1c of 6.4, borderline diabetic.  - Considered vitamin D supplementation, but patient does not meet criteria for testing.  - Reviewed weight trends, noting discrepancy in recent weight measurement.  - Assessed eligibility for shingles and pneumococcal vaccines based on age (59, soon to be 60).    PREDIABETES:  - Monitored the patient's A1c, which was 6.4 a year ago, indicating prediabetes.  - Evaluated the patient's weight, noting an increase from 186-187 to 198 lbs since January.  - Assessed the need to recheck A1c and current status.  - Inquired about the patient's diet and exercise habits.  - Ordered A1c test to reassess current status.  - Advised the patient to maintain vigilance with diet to manage prediabetes.  - Explained limited calorie-burning effects of exercise, particularly lawn mowing.    HYPERLIPIDEMIA:  - Monitored the patient's cholesterol levels, noting the last check was a year ago.  -  Assessed the need to recheck cholesterol levels.  - Ordered lipid panel for current evaluation.    ALLERGIC RHINITIS:  - Monitored the patient's use of Flonase nasal spray for allergy management.  - Evaluated the improvement in allergy symptoms with regular use of nasal spray.  - Assessed the effectiveness of current treatment.  - Continued Flonase nasal spray, to be used daily as previously instructed.  - Discussed the importance of consistent use of nasal spray for allergy management.  - Provided refill for Flonase nasal spray.  - Instructed the patient to continue current allergy management routine with nasal spray.    ERECTILE DYSFUNCTION:  - Initiated Cialis treatment.  - Provided refill for Cialis.        Abiodun was seen today for follow-up and medication refill.    Diagnoses and all orders for this visit:    Mixed hyperlipidemia  -     Lipid Panel; Future  -     Comprehensive Metabolic Panel; Future    Erectile dysfunction, unspecified erectile dysfunction type  -     tadalafiL (CIALIS) 20 MG Tab; Take 1 tablet (20 mg total) by mouth once daily.    H/O seasonal allergies  -     fluticasone propionate (FLONASE) 50 mcg/actuation nasal spray; 1 spray (50 mcg total) by Each Nostril route once daily.    Immunization due  -     Hemoglobin A1C; Future    Encounter for prostate cancer screening  -     PSA, Screening; Future         Follow up in about 3 months (around 7/3/2025).  This note was generated with the assistance of ambient listening technology. Verbal consent was obtained by the patient and accompanying visitor(s) for the recording of patient appointment to facilitate this note. I attest to having reviewed and edited the generated note for accuracy, though some syntax or spelling errors may persist. Please contact the author of this note for any clarification.            [1]   Current Outpatient Medications   Medication Sig Dispense Refill    atorvastatin (LIPITOR) 20 MG tablet TAKE 1 TABLET BY MOUTH EVERY  DAY 90 tablet 1    fluticasone propionate (FLONASE) 50 mcg/actuation nasal spray 1 spray (50 mcg total) by Each Nostril route once daily. 48 mL 3    meclizine (ANTIVERT) 25 mg tablet Take 1 tablet (25 mg total) by mouth 3 (three) times daily as needed for Dizziness. (Patient not taking: Reported on 4/3/2025) 20 tablet 0    tadalafiL (CIALIS) 20 MG Tab Take 1 tablet (20 mg total) by mouth once daily. 30 tablet 11     No current facility-administered medications for this visit.   [2]   Social History  Socioeconomic History    Marital status:    Tobacco Use    Smoking status: Never    Smokeless tobacco: Never   Substance and Sexual Activity    Alcohol use: Yes    Drug use: No     Social Drivers of Health     Stress: No Stress Concern Present (10/29/2019)    [employer] [place] [employer] - Occupational Stress Questionnaire     Feeling of Stress : Not at all

## 2025-04-15 ENCOUNTER — RESULTS FOLLOW-UP (OUTPATIENT)
Dept: FAMILY MEDICINE | Facility: CLINIC | Age: 60
End: 2025-04-15

## 2025-04-15 ENCOUNTER — LAB VISIT (OUTPATIENT)
Dept: LAB | Facility: HOSPITAL | Age: 60
End: 2025-04-15
Attending: FAMILY MEDICINE
Payer: COMMERCIAL

## 2025-04-15 DIAGNOSIS — Z12.5 ENCOUNTER FOR PROSTATE CANCER SCREENING: ICD-10-CM

## 2025-04-15 DIAGNOSIS — E78.2 MIXED HYPERLIPIDEMIA: ICD-10-CM

## 2025-04-15 DIAGNOSIS — Z23 IMMUNIZATION DUE: ICD-10-CM

## 2025-04-15 LAB
ALBUMIN SERPL-MCNC: 4.4 G/DL (ref 3.5–5.2)
ALP SERPL-CCNC: 51 UNIT/L (ref 55–135)
ALT SERPL-CCNC: 31 UNIT/L (ref 10–44)
ANION GAP (SMH): 6 MMOL/L (ref 8–16)
AST SERPL-CCNC: 21 UNIT/L (ref 10–40)
BILIRUB SERPL-MCNC: 0.7 MG/DL (ref 0.1–1)
BUN SERPL-MCNC: 16 MG/DL (ref 6–20)
CALCIUM SERPL-MCNC: 9 MG/DL (ref 8.7–10.5)
CHLORIDE SERPL-SCNC: 104 MMOL/L (ref 95–110)
CHOLEST SERPL-MCNC: 181 MG/DL (ref 120–199)
CHOLEST/HDLC SERPL: 3.3 {RATIO} (ref 2–5)
CO2 SERPL-SCNC: 28 MMOL/L (ref 23–29)
CREAT SERPL-MCNC: 1 MG/DL (ref 0.5–1.4)
EAG (SMH): 131 MG/DL (ref 68–131)
GFR SERPLBLD CREATININE-BSD FMLA CKD-EPI: >60 ML/MIN/1.73/M2
GLUCOSE SERPL-MCNC: 100 MG/DL (ref 70–110)
HBA1C MFR BLD: 6.2 % (ref 4.5–6.2)
HDLC SERPL-MCNC: 55 MG/DL (ref 40–75)
HDLC SERPL: 30.4 % (ref 20–50)
LDLC SERPL CALC-MCNC: 114.6 MG/DL (ref 63–159)
NONHDLC SERPL-MCNC: 126 MG/DL
POTASSIUM SERPL-SCNC: 4.4 MMOL/L (ref 3.5–5.1)
PROT SERPL-MCNC: 7.9 GM/DL (ref 6–8.4)
PSA SERPL-MCNC: 2.18 NG/ML (ref ?–4)
SODIUM SERPL-SCNC: 138 MMOL/L (ref 136–145)
TRIGL SERPL-MCNC: 57 MG/DL (ref 30–150)

## 2025-04-15 PROCEDURE — 82374 ASSAY BLOOD CARBON DIOXIDE: CPT

## 2025-04-15 PROCEDURE — 84153 ASSAY OF PSA TOTAL: CPT

## 2025-04-15 PROCEDURE — 82465 ASSAY BLD/SERUM CHOLESTEROL: CPT

## 2025-04-15 PROCEDURE — 36415 COLL VENOUS BLD VENIPUNCTURE: CPT

## 2025-04-15 PROCEDURE — 83036 HEMOGLOBIN GLYCOSYLATED A1C: CPT

## 2025-09-05 DIAGNOSIS — E78.2 MIXED HYPERLIPIDEMIA: ICD-10-CM

## 2025-09-05 RX ORDER — ATORVASTATIN CALCIUM 20 MG/1
20 TABLET, FILM COATED ORAL
Qty: 90 TABLET | Refills: 2 | Status: SHIPPED | OUTPATIENT
Start: 2025-09-05